# Patient Record
Sex: FEMALE | Race: WHITE | NOT HISPANIC OR LATINO | Employment: FULL TIME | URBAN - METROPOLITAN AREA
[De-identification: names, ages, dates, MRNs, and addresses within clinical notes are randomized per-mention and may not be internally consistent; named-entity substitution may affect disease eponyms.]

---

## 2018-09-28 ENCOUNTER — OFFICE VISIT (OUTPATIENT)
Dept: INTERNAL MEDICINE CLINIC | Facility: CLINIC | Age: 58
End: 2018-09-28
Payer: COMMERCIAL

## 2018-09-28 VITALS
WEIGHT: 143.2 LBS | HEIGHT: 64 IN | BODY MASS INDEX: 24.45 KG/M2 | DIASTOLIC BLOOD PRESSURE: 74 MMHG | SYSTOLIC BLOOD PRESSURE: 108 MMHG | OXYGEN SATURATION: 98 % | HEART RATE: 65 BPM | TEMPERATURE: 98.2 F

## 2018-09-28 DIAGNOSIS — Z00.00 WELLNESS EXAMINATION: ICD-10-CM

## 2018-09-28 DIAGNOSIS — Z23 NEED FOR SHINGLES VACCINE: ICD-10-CM

## 2018-09-28 DIAGNOSIS — Z11.59 NEED FOR HEPATITIS C SCREENING TEST: ICD-10-CM

## 2018-09-28 DIAGNOSIS — Z12.39 SCREENING FOR BREAST CANCER: ICD-10-CM

## 2018-09-28 DIAGNOSIS — E78.2 MIXED HYPERLIPIDEMIA: Primary | ICD-10-CM

## 2018-09-28 DIAGNOSIS — Z82.49 FAMILY HISTORY OF CORONARY ARTERY DISEASE: ICD-10-CM

## 2018-09-28 PROCEDURE — 99386 PREV VISIT NEW AGE 40-64: CPT | Performed by: INTERNAL MEDICINE

## 2018-09-28 NOTE — PATIENT INSTRUCTIONS
Problem List Items Addressed This Visit     Mixed hyperlipidemia - Primary       Continue with healthy diet and exercise, will check labs         Relevant Orders    CBC and differential    Comprehensive metabolic panel    Lipid Panel with Direct LDL reflex    TSH, 3rd generation with Free T4 reflex    Wellness examination       Discussed preventative health, cancer screening, immunizations, and safety issues  Patient's  brought medical records with her and will enter in the documentation for her last mammogram, her last colonoscopy, any immunizations, and most recent labs             Other Visit Diagnoses     Family history of coronary artery disease        Relevant Orders    Ambulatory referral to Cardiology    Need for shingles vaccine        Relevant Medications    Zoster Vac Recomb Adjuvanted ([address]) 50 MCG SUSR    Need for hepatitis C screening test        Relevant Orders    Hepatitis C antibody    Screening for breast cancer        Relevant Orders    Mammo screening bilateral w cad

## 2018-09-28 NOTE — PROGRESS NOTES
Assessment/Plan:    Mixed hyperlipidemia    Continue with healthy diet and exercise, will check labs    Wellness examination    Discussed preventative health, cancer screening, immunizations, and safety issues  Patient's  brought medical records with her and will enter in the documentation for her last mammogram, her last colonoscopy, any immunizations, and most recent labs  Diagnoses and all orders for this visit:    Mixed hyperlipidemia  -     CBC and differential; Future  -     Comprehensive metabolic panel; Future  -     Lipid Panel with Direct LDL reflex; Future  -     TSH, 3rd generation with Free T4 reflex; Future    Family history of coronary artery disease  -     Ambulatory referral to Cardiology; Future    Need for shingles vaccine  -     Zoster Vac Recomb Adjuvanted ([address]) 50 MCG SUSR; Inject 50 mcg into a muscle once for 1 dose Repeat one dose in 2 to 6 months    Need for hepatitis C screening test  -     Hepatitis C antibody; Future    Wellness examination    Screening for breast cancer  -     Mammo screening bilateral w cad; Future    Other orders  -     Cancel: influenza vaccine, [id number], quadrivalent, recombinant, PF, 0 5 mL, for patients 18 yr+ (FLUBLOK)          Subjective:      Patient ID: Aurora Estevez is a 61 y o  female  Patient here to establish care  Patient is active, eats healthy diet, sees the dentist regularly, no smoking cigarettes  Patient was in [place]  And was hospitalized for severe bout of gastroenteritis in July 2018      hyperlipidemia: This has been borderline, patient has been watching diet for this  The following portions of the patient's history were reviewed and updated as appropriate: allergies, current medications, past family history, past medical history, past social history, past surgical history and problem list     Review of Systems   Constitutional: Negative for chills, fatigue and fever     HENT: Negative for congestion, nosebleeds, postnasal drip, sore throat and trouble swallowing  Eyes: Negative for pain  Respiratory: Negative for cough, chest tightness, shortness of breath and wheezing  Cardiovascular: Negative for chest pain, palpitations and leg swelling  Gastrointestinal: Negative for abdominal pain, constipation, diarrhea, nausea and vomiting  Endocrine: Negative for polydipsia and polyuria  Genitourinary: Negative for dysuria, flank pain and hematuria  Musculoskeletal: Negative for arthralgias  Skin: Negative for rash  Neurological: Negative for dizziness, tremors and headaches  Hematological: Does not bruise/bleed easily  Psychiatric/Behavioral: Negative for confusion and dysphoric mood  The patient is not nervous/anxious  Objective:      /74   Pulse 65   Temp 98 2 °F (36 8 °C)   Ht 5' 4 3" (1 633 m)   Wt 65 kg (143 lb 3 2 oz)   SpO2 98%   BMI 24 35 kg/m²          Physical Exam   Constitutional: She is oriented to person, place, and time  She appears well-developed and well-nourished  No distress  HENT:   Head: Normocephalic and atraumatic  Right Ear: External ear normal    Left Ear: External ear normal    Eyes: Conjunctivae are normal  No scleral icterus  Neck: Normal range of motion  Neck supple  No tracheal deviation present  No thyromegaly present  Cardiovascular: Normal rate, regular rhythm and normal heart sounds  No murmur heard  Pulmonary/Chest: Effort normal and breath sounds normal  No respiratory distress  She has no wheezes  She has no rales  Abdominal: Soft  Bowel sounds are normal  There is no tenderness  There is no rebound and no guarding  Musculoskeletal: She exhibits no edema  Lymphadenopathy:     She has no cervical adenopathy  Neurological: She is alert and oriented to person, place, and time  Psychiatric: She has a normal mood and affect  Her behavior is normal  Judgment and thought content normal    Vitals reviewed

## 2018-09-28 NOTE — ASSESSMENT & PLAN NOTE
Discussed preventative health, cancer screening, immunizations, and safety issues  Patient's  brought medical records with her and will enter in the documentation for her last mammogram, her last colonoscopy, any immunizations, and most recent labs

## 2018-10-10 LAB
ALBUMIN SERPL-MCNC: 4.4 G/DL (ref 3.6–5.1)
ALBUMIN/GLOB SERPL: 1.6 (CALC) (ref 1–2.5)
ALP SERPL-CCNC: 94 U/L (ref 33–130)
ALT SERPL-CCNC: 14 U/L (ref 6–29)
AST SERPL-CCNC: 19 U/L (ref 10–35)
BASOPHILS # BLD AUTO: 68 CELLS/UL (ref 0–200)
BASOPHILS NFR BLD AUTO: 1.2 %
BILIRUB SERPL-MCNC: 0.5 MG/DL (ref 0.2–1.2)
BUN SERPL-MCNC: 11 MG/DL (ref 7–25)
BUN/CREAT SERPL: NORMAL (CALC) (ref 6–22)
CALCIUM SERPL-MCNC: 9.8 MG/DL (ref 8.6–10.4)
CHLORIDE SERPL-SCNC: 104 MMOL/L (ref 98–110)
CHOLEST SERPL-MCNC: 190 MG/DL
CHOLEST/HDLC SERPL: 3.4 (CALC)
CO2 SERPL-SCNC: 29 MMOL/L (ref 20–32)
CREAT SERPL-MCNC: 0.87 MG/DL (ref 0.5–1.05)
EOSINOPHIL # BLD AUTO: 108 CELLS/UL (ref 15–500)
EOSINOPHIL NFR BLD AUTO: 1.9 %
ERYTHROCYTE [DISTWIDTH] IN BLOOD BY AUTOMATED COUNT: 13.2 % (ref 11–15)
GLOBULIN SER CALC-MCNC: 2.7 G/DL (CALC) (ref 1.9–3.7)
GLUCOSE SERPL-MCNC: 85 MG/DL (ref 65–99)
HCT VFR BLD AUTO: 37 % (ref 35–45)
HCV AB S/CO SERPL IA: 0.01
HCV AB SERPL QL IA: NORMAL
HDLC SERPL-MCNC: 56 MG/DL
HGB BLD-MCNC: 12.2 G/DL (ref 11.7–15.5)
LDLC SERPL CALC-MCNC: 109 MG/DL (CALC)
LYMPHOCYTES # BLD AUTO: 2314 CELLS/UL (ref 850–3900)
LYMPHOCYTES NFR BLD AUTO: 40.6 %
MCH RBC QN AUTO: 30.8 PG (ref 27–33)
MCHC RBC AUTO-ENTMCNC: 33 G/DL (ref 32–36)
MCV RBC AUTO: 93.4 FL (ref 80–100)
MONOCYTES # BLD AUTO: 359 CELLS/UL (ref 200–950)
MONOCYTES NFR BLD AUTO: 6.3 %
NEUTROPHILS # BLD AUTO: 2850 CELLS/UL (ref 1500–7800)
NEUTROPHILS NFR BLD AUTO: 50 %
NONHDLC SERPL-MCNC: 134 MG/DL (CALC)
PLATELET # BLD AUTO: 307 THOUSAND/UL (ref 140–400)
PMV BLD REES-ECKER: 11.2 FL (ref 7.5–12.5)
POTASSIUM SERPL-SCNC: 4.5 MMOL/L (ref 3.5–5.3)
PROT SERPL-MCNC: 7.1 G/DL (ref 6.1–8.1)
RBC # BLD AUTO: 3.96 MILLION/UL (ref 3.8–5.1)
SL AMB EGFR AFRICAN AMERICAN: 85 ML/MIN/1.73M2
SL AMB EGFR NON AFRICAN AMERICAN: 73 ML/MIN/1.73M2
SODIUM SERPL-SCNC: 139 MMOL/L (ref 135–146)
TRIGL SERPL-MCNC: 135 MG/DL
TSH SERPL-ACNC: 2.27 MIU/L (ref 0.4–4.5)
WBC # BLD AUTO: 5.7 THOUSAND/UL (ref 3.8–10.8)

## 2018-11-01 ENCOUNTER — HOSPITAL ENCOUNTER (OUTPATIENT)
Dept: RADIOLOGY | Age: 58
Discharge: HOME | End: 2018-11-01
Payer: COMMERCIAL

## 2018-11-01 DIAGNOSIS — Z12.39 SCREENING FOR BREAST CANCER: ICD-10-CM

## 2018-11-01 PROCEDURE — 77067 SCR MAMMO BI INCL CAD: CPT

## 2018-11-08 ENCOUNTER — OFFICE VISIT (OUTPATIENT)
Dept: CARDIOLOGY CLINIC | Facility: CLINIC | Age: 58
End: 2018-11-08
Payer: COMMERCIAL

## 2018-11-08 VITALS
BODY MASS INDEX: 24.92 KG/M2 | DIASTOLIC BLOOD PRESSURE: 70 MMHG | SYSTOLIC BLOOD PRESSURE: 110 MMHG | HEIGHT: 64 IN | WEIGHT: 146 LBS | HEART RATE: 55 BPM

## 2018-11-08 DIAGNOSIS — Z82.49 FAMILY HISTORY OF CORONARY ARTERY DISEASE: ICD-10-CM

## 2018-11-08 DIAGNOSIS — Z00.00 WELLNESS EXAMINATION: Primary | ICD-10-CM

## 2018-11-08 PROCEDURE — 99203 OFFICE O/P NEW LOW 30 MIN: CPT | Performed by: INTERNAL MEDICINE

## 2018-11-08 PROCEDURE — 93000 ELECTROCARDIOGRAM COMPLETE: CPT | Performed by: INTERNAL MEDICINE

## 2018-11-08 NOTE — PROGRESS NOTES
Cardiology Consultation      Chuy Baker  1960  [id number]  [address] [facility] Renata Resendiz [facility] 141  [phone]    1  Wellness examination  POCT ECG   2  Family history of coronary artery disease  Ambulatory referral to Cardiology    POCT ECG     Reason: Family hx cad  Requesting: FABBY Gavin MD       History:     Patient presents for cardiovascular evaluation and sent for consultation regarding family history of coronary artery disease  Patient states that her parents were in her 62s with the developed coronary artery disease  Grandfather may have been in his late 45s but unclear details  Several maternal aunts have lived to their 80s    Nonetheless, as result she was referred for evaluation  Cholesterol profile as below, 10 year risk well below 7 5%  She recently moved back to [facility] where she grew up from [place]  They have lived in the [place] [address] [employer] work  She is completely asymptomatic  She is able to walk at least 5 miles without difficulty including hills  She denies any lightheadedness dizziness palpitations  A few of her sisters have had atrial fibrillation but no coronary artery disease  Patient Active Problem List   Diagnosis    Mixed hyperlipidemia    Wellness examination    Family history of coronary artery disease     Past Medical History:   Diagnosis Date    Dengue fever 2000    Malaria [address]    Miscarriage      Social History     Social History    Marital status: /Civil Union     Spouse name: N/A    Number of children: N/A    Years of education: N/A     Occupational History    Not on file       Social History Main Topics    Smoking status: Never Smoker    Smokeless tobacco: Never Used    Alcohol use Yes      Comment: rare    Drug use: Unknown    Sexual activity: Yes     Partners: Male     Other Topics Concern    Not on file Social History Narrative     no children    Works as [employer] , travels [address] History   Problem Relation Age of Onset    Coronary artery disease Mother     Coronary artery disease Father     Coronary artery disease Maternal Grandfather     Coronary artery disease Paternal Grandfather      Past Surgical History:   Procedure Laterality Date    NO PAST SURGERIES       No current outpatient prescriptions on file  Allergies   Allergen Reactions    Contrast Dye [Iodinated Diagnostic Agents] Anaphylaxis    Typhoid Vaccines Dermatitis    Airborne [B-Plex Plus]     Sulfa Antibiotics Vomiting, Fever and Wheezing       Social, Family and medication history as listed, reviewed and updated as necessary    Labs:   Lab Results   Component Value Date    K 4 5 10/09/2018     10/09/2018    CO2 29 10/09/2018    BUN 11 10/09/2018    CALCIUM 9 8 10/09/2018       Lab Results   Component Value Date    WBC 5 7 10/09/2018    HGB 12 2 10/09/2018    HCT 37 0 10/09/2018     10/09/2018       No results found for: CHOL  Lab Results   Component Value Date    HDL 56 10/09/2018     No results found for: [facility]  Lab Results   Component Value Date    TRIG 135 10/09/2018       No results found for: ALT, AST          No results found for: NTBNP    No results found for: HGBA1C    Imaging: Reviewed in epic  ECG today reveals sinus bradycardia and is within normal limits    Review of Systems:  14 systems reviewed and negative with exception of the above       PHYSICAL EXAM:        Vitals:    11/08/18 1540   BP: 110/70   Pulse: 55     Body mass index is 24 83 kg/m²    Weight (last 2 days)     Date/Time   Weight    11/08/18 1540  66 2 (146)              Gen: No acute distress  HEENT: anicteric, mucous membranes moist  Neck: supple, no jugular venous distention, or carotid bruit  Heart: regular, normal s1 and s2, no murmur/rub or gallop  Lungs :clear to auscultation bilaterally, no rales/rhonchi or wheeze  Abdomen: soft nontender, normoactive bowel sounds, no organomegaly  Ext: warm and perfused, normal femoral pulses, no edema, or clubbing  Skin: warm, no rashes  Neuro: AAO x 3, no focal findings  Psychiatric: normal affect  Musculoskeletal: no obvious joint deformities  Discussion/Summary:    1  Family history of coronary artery disease, none of which appears to be premature, additionally, cholesterol profile without familial pattern and 10 year cardiovascular risk is well below 7 5% (1 9%)    Plan:  Discussed continued daily activity/exercise and sensible/Mediterranean type diet  Cholesterol profile with 10 year risk well below 7 5% 1 9%  Certainly, no familial pattern with this profile  Although has family history of coronary artery disease most of this was with relatives in their 62s and not premature     Maternal aunts have lived to their 80s  She has a normal ECG, completely asymptomatic  Discussed alternative risk assessment with coronary calcium scoring  Discussed out-of-pocket expense with this  Discussed if score was abnormal this would likely suggest primary prevention statin    She is not interested in this which I support based on her 10 year risk, asymptomatic state  We left follow-up open-ended

## 2019-02-06 ENCOUNTER — TELEPHONE (OUTPATIENT)
Dept: INTERNAL MEDICINE CLINIC | Facility: CLINIC | Age: 59
End: 2019-02-06

## 2019-02-06 DIAGNOSIS — Z71.84 TRAVEL ADVICE ENCOUNTER: Primary | ICD-10-CM

## 2019-02-06 RX ORDER — DOXYCYCLINE 100 MG/1
100 CAPSULE ORAL 2 TIMES DAILY
Qty: 14 CAPSULE | Refills: 0 | Status: SHIPPED | OUTPATIENT
Start: 2019-02-06 | End: 2019-02-13

## 2019-03-07 DIAGNOSIS — J06.9 UPPER RESPIRATORY TRACT INFECTION, UNSPECIFIED TYPE: Primary | ICD-10-CM

## 2019-03-07 RX ORDER — DOXYCYCLINE 100 MG/1
CAPSULE ORAL
Qty: 14 CAPSULE | Refills: 0 | Status: SHIPPED | OUTPATIENT
Start: 2019-03-07 | End: 2019-03-21

## 2019-08-19 ENCOUNTER — TELEPHONE (OUTPATIENT)
Dept: INTERNAL MEDICINE CLINIC | Facility: CLINIC | Age: 59
End: 2019-08-19

## 2019-08-19 DIAGNOSIS — Z13.220 SCREENING FOR HYPERCHOLESTEROLEMIA: ICD-10-CM

## 2019-08-19 DIAGNOSIS — Z13.1 SCREENING FOR DIABETES MELLITUS: ICD-10-CM

## 2019-08-19 DIAGNOSIS — Z13.0 ENCOUNTER FOR SCREENING FOR DISEASES OF THE BLOOD AND BLOOD-FORMING ORGANS AND CERTAIN DISORDERS INVOLVING THE IMMUNE MECHANISM: Primary | ICD-10-CM

## 2019-08-19 DIAGNOSIS — Z13.29 SCREENING FOR THYROID DISORDER: ICD-10-CM

## 2019-08-19 NOTE — TELEPHONE ENCOUNTER
Patient is scheduled 9/30 for a wellness  Patient is asking if any blood work will need to be done prior      Please advise

## 2019-09-30 ENCOUNTER — OFFICE VISIT (OUTPATIENT)
Dept: INTERNAL MEDICINE CLINIC | Facility: CLINIC | Age: 59
End: 2019-09-30
Payer: COMMERCIAL

## 2019-09-30 VITALS
TEMPERATURE: 98.5 F | OXYGEN SATURATION: 99 % | HEIGHT: 64 IN | DIASTOLIC BLOOD PRESSURE: 68 MMHG | HEART RATE: 63 BPM | SYSTOLIC BLOOD PRESSURE: 106 MMHG | BODY MASS INDEX: 24.65 KG/M2 | WEIGHT: 144.4 LBS

## 2019-09-30 DIAGNOSIS — G89.29 CHRONIC RIGHT-SIDED LOW BACK PAIN WITHOUT SCIATICA: ICD-10-CM

## 2019-09-30 DIAGNOSIS — H53.9 VISUAL DISTURBANCE: Primary | ICD-10-CM

## 2019-09-30 DIAGNOSIS — Z23 NEED FOR DIPHTHERIA-TETANUS-PERTUSSIS (TDAP) VACCINE: ICD-10-CM

## 2019-09-30 DIAGNOSIS — Z00.00 WELLNESS EXAMINATION: ICD-10-CM

## 2019-09-30 DIAGNOSIS — M54.50 CHRONIC RIGHT-SIDED LOW BACK PAIN WITHOUT SCIATICA: ICD-10-CM

## 2019-09-30 DIAGNOSIS — K63.5 POLYP OF COLON, UNSPECIFIED PART OF COLON, UNSPECIFIED TYPE: ICD-10-CM

## 2019-09-30 DIAGNOSIS — E78.2 MIXED HYPERLIPIDEMIA: ICD-10-CM

## 2019-09-30 DIAGNOSIS — Z12.39 SCREENING FOR BREAST CANCER: ICD-10-CM

## 2019-09-30 PROCEDURE — 90471 IMMUNIZATION ADMIN: CPT

## 2019-09-30 PROCEDURE — 90715 TDAP VACCINE 7 YRS/> IM: CPT

## 2019-09-30 PROCEDURE — 99396 PREV VISIT EST AGE 40-64: CPT | Performed by: INTERNAL MEDICINE

## 2019-09-30 NOTE — PATIENT INSTRUCTIONS
Problem List Items Addressed This Visit        Other    Mixed hyperlipidemia     Continue with healthy diet and exercise         Wellness examination     Discussed preventative health, cancer screening, immunizations, and safety issues  Patient is due for colonoscopy, last 1 was Jory 10, 2016 with a polyp found with recommendations to follow up in 3 years  I recommend Tdap vaccination today  The patient had the flu shot last week  I recommend getting the Shingrix shot to help prevent Shingles  You can get it a pharmacy, and they can administer it there  It is a two shot series with the second shot needed between 2-6 months after the first shot  I would not recommend getting the shot before an important or fun event in case you were to have a reaction to the shot like a sore arm or flu-like symptoms  I make the same recommendation about any shot, as people can have a reaction to any shot  Visual disturbance - Primary     Symptoms do not sound like classic ocular migraines, follow up eye doctor  Chronic right-sided low back pain without sciatica     This improved with exercises             Other Visit Diagnoses     Screening for breast cancer        Relevant Orders    Mammo screening bilateral w cad    Need for diphtheria-tetanus-pertussis (Tdap) vaccine        Relevant Orders    TDAP VACCINE GREATER THAN OR EQUAL TO 8YO IM

## 2019-09-30 NOTE — ASSESSMENT & PLAN NOTE
Discussed preventative health, cancer screening, immunizations, and safety issues  Patient is due for colonoscopy, last 1 was Jory 10, 2016 with a polyp found with recommendations to follow up in 3 years  I recommend Tdap vaccination today  The patient had the flu shot last week  I recommend getting the Shingrix shot to help prevent Shingles  You can get it a pharmacy, and they can administer it there  It is a two shot series with the second shot needed between 2-6 months after the first shot  I would not recommend getting the shot before an important or fun event in case you were to have a reaction to the shot like a sore arm or flu-like symptoms  I make the same recommendation about any shot, as people can have a reaction to any shot

## 2019-09-30 NOTE — PROGRESS NOTES
Assessment/Plan:    Visual disturbance  Symptoms do not sound like classic ocular migraines, follow up eye doctor  Chronic right-sided low back pain without sciatica  This improved with exercises  Wellness examination  Discussed preventative health, cancer screening, immunizations, and safety issues  Patient is due for colonoscopy, last 1 was Jory 10, 2016 with a polyp found with recommendations to follow up in 3 years  I recommend Tdap vaccination today  The patient had the flu shot last week  I recommend getting the Shingrix shot to help prevent Shingles  You can get it a pharmacy, and they can administer it there  It is a two shot series with the second shot needed between 2-6 months after the first shot  I would not recommend getting the shot before an important or fun event in case you were to have a reaction to the shot like a sore arm or flu-like symptoms  I make the same recommendation about any shot, as people can have a reaction to any shot  Mixed hyperlipidemia  Continue with healthy diet and exercise       Diagnoses and all orders for this visit:    Visual disturbance    Chronic right-sided low back pain without sciatica    Screening for breast cancer  -     Mammo screening bilateral w cad; Future    Need for diphtheria-tetanus-pertussis (Tdap) vaccine  -     TDAP VACCINE GREATER THAN OR EQUAL TO 6YO IM    Wellness examination    Mixed hyperlipidemia    Other orders  -     Cancel: Ambulatory referral to Colorectal Surgery; Future  -     Cancel: influenza vaccine, [id number], quadrivalent, recombinant, PF, 0 5 mL, for patients 18 yr+ (FLUBLOK)          Subjective:      Patient ID: Barbara Moreira is a 60 y o  female  Patient has some flashing sensation in in the right lateral field of view of her eye about a week ago, she did see the eye doctor and was checked for retinal tear, patient having frequent symptoms for couple of days, and then the symptoms have continued occasionally  No headaches  Wellness:  No smoking cigarettes, she sees a dentist regularly, eats healthy diet, exercises  The following portions of the patient's history were reviewed and updated as appropriate: allergies, current medications, past family history, past medical history, past social history, past surgical history and problem list     Review of Systems   Constitutional: Negative for chills, fatigue and fever  HENT: Negative for congestion, nosebleeds, postnasal drip, sore throat and trouble swallowing  Eyes: Negative for pain  Respiratory: Negative for cough, chest tightness, shortness of breath and wheezing  Cardiovascular: Negative for chest pain, palpitations and leg swelling  Gastrointestinal: Negative for abdominal pain, constipation, diarrhea, nausea and vomiting  Endocrine: Negative for polydipsia and polyuria  Genitourinary: Negative for dysuria, flank pain and hematuria  Musculoskeletal: Negative for arthralgias  Skin: Negative for rash  Neurological: Negative for dizziness, tremors and headaches  Hematological: Does not bruise/bleed easily  Psychiatric/Behavioral: Negative for confusion and dysphoric mood  The patient is not nervous/anxious  Objective:      /68   Pulse 63   Temp 98 5 °F (36 9 °C)   Ht 5' 4 2" (1 631 m)   Wt 65 5 kg (144 lb 6 4 oz)   SpO2 99%   BMI 24 63 kg/m²          Physical Exam   Constitutional: She is oriented to person, place, and time  She appears well-developed and well-nourished  No distress  HENT:   Head: Normocephalic and atraumatic  Right Ear: External ear normal    Left Ear: External ear normal    Eyes: Conjunctivae are normal  No scleral icterus  Neck: Normal range of motion  Neck supple  No tracheal deviation present  No thyromegaly present  Cardiovascular: Normal rate, regular rhythm and normal heart sounds  No murmur heard  Pulmonary/Chest: Effort normal and breath sounds normal  No respiratory distress   She has no wheezes  She has no rales  Abdominal: Soft  Bowel sounds are normal  There is no tenderness  There is no rebound and no guarding  Musculoskeletal: She exhibits no edema  Lymphadenopathy:     She has no cervical adenopathy  Neurological: She is alert and oriented to person, place, and time  Psychiatric: She has a normal mood and affect  Her behavior is normal  Judgment and thought content normal    Vitals reviewed

## 2019-10-03 ENCOUNTER — OFFICE VISIT (OUTPATIENT)
Dept: INTERNAL MEDICINE CLINIC | Facility: CLINIC | Age: 59
End: 2019-10-03
Payer: COMMERCIAL

## 2019-10-03 VITALS
DIASTOLIC BLOOD PRESSURE: 76 MMHG | HEART RATE: 52 BPM | WEIGHT: 143.6 LBS | BODY MASS INDEX: 24.52 KG/M2 | SYSTOLIC BLOOD PRESSURE: 120 MMHG | RESPIRATION RATE: 14 BRPM | HEIGHT: 64 IN | OXYGEN SATURATION: 98 % | TEMPERATURE: 97.5 F

## 2019-10-03 DIAGNOSIS — T50.A25A: Primary | ICD-10-CM

## 2019-10-03 PROCEDURE — 3008F BODY MASS INDEX DOCD: CPT | Performed by: NURSE PRACTITIONER

## 2019-10-03 PROCEDURE — 99213 OFFICE O/P EST LOW 20 MIN: CPT | Performed by: NURSE PRACTITIONER

## 2019-10-03 NOTE — PROGRESS NOTES
Assessment/Plan:    Adverse reaction to diphtheria and tetanus vaccine  Reviewed side effects of vaccine  Due to the itching we will document this reaction  Go to ER if worsening or having facial swelling       Diagnoses and all orders for this visit:    Adverse effect of diphtheria and tetanus vaccine, initial encounter          Subjective:      Patient ID: Mary Man is a 60 y o  female  Patient is here for localized swelling, redness, pain, itching of the injection site after getting the tdap yesterday      The following portions of the patient's history were reviewed and updated as appropriate: allergies, current medications, past family history, past medical history, past social history, past surgical history and problem list     Review of Systems   Constitutional: Negative  HENT: Negative  Eyes: Negative  Respiratory: Negative  Cardiovascular: Negative  Gastrointestinal: Negative  Musculoskeletal: Negative  Skin: Positive for rash  Neurological: Negative  Objective:      /76   Pulse (!) 52   Temp 97 5 °F (36 4 °C) (Oral)   Resp 14   Ht 5' 4 2" (1 631 m)   Wt 65 1 kg (143 lb 9 6 oz)   SpO2 98%   BMI 24 50 kg/m²          Physical Exam   Constitutional: She is oriented to person, place, and time  She appears well-developed and well-nourished  HENT:   Head: Normocephalic and atraumatic  Right Ear: External ear normal    Left Ear: External ear normal    Nose: Nose normal    Mouth/Throat: Oropharynx is clear and moist    Eyes: Pupils are equal, round, and reactive to light  Conjunctivae are normal    Neck: Normal range of motion  Neck supple  Cardiovascular: Normal rate and regular rhythm  Pulmonary/Chest: Effort normal and breath sounds normal    Abdominal: Soft  Bowel sounds are normal    Musculoskeletal: Normal range of motion  Neurological: She is alert and oriented to person, place, and time  Skin: Skin is warm and dry          Nursing note and vitals reviewed

## 2019-10-06 PROBLEM — T50.A25A: Status: ACTIVE | Noted: 2019-10-06

## 2019-10-06 NOTE — ASSESSMENT & PLAN NOTE
Reviewed side effects of vaccine  Due to the itching we will document this reaction  Go to ER if worsening or having facial swelling

## 2020-01-03 ENCOUNTER — HOSPITAL ENCOUNTER (OUTPATIENT)
Dept: RADIOLOGY | Age: 60
Discharge: HOME | End: 2020-01-03
Payer: COMMERCIAL

## 2020-01-03 VITALS — BODY MASS INDEX: 24.41 KG/M2 | WEIGHT: 143 LBS | HEIGHT: 64 IN

## 2020-01-03 DIAGNOSIS — Z12.39 SCREENING FOR BREAST CANCER: ICD-10-CM

## 2020-01-03 PROCEDURE — 77067 SCR MAMMO BI INCL CAD: CPT

## 2020-05-18 ENCOUNTER — OFFICE VISIT (OUTPATIENT)
Dept: OBGYN CLINIC | Facility: CLINIC | Age: 60
End: 2020-05-18
Payer: COMMERCIAL

## 2020-05-18 VITALS
WEIGHT: 144.4 LBS | DIASTOLIC BLOOD PRESSURE: 64 MMHG | TEMPERATURE: 97.1 F | BODY MASS INDEX: 25.59 KG/M2 | HEIGHT: 63 IN | SYSTOLIC BLOOD PRESSURE: 112 MMHG

## 2020-05-18 DIAGNOSIS — Z12.11 SCREENING FOR COLON CANCER: ICD-10-CM

## 2020-05-18 DIAGNOSIS — Z11.51 SCREENING FOR HPV (HUMAN PAPILLOMAVIRUS): ICD-10-CM

## 2020-05-18 DIAGNOSIS — Z12.31 ENCOUNTER FOR SCREENING MAMMOGRAM FOR MALIGNANT NEOPLASM OF BREAST: ICD-10-CM

## 2020-05-18 DIAGNOSIS — Z01.419 ENCOUNTER FOR GYNECOLOGICAL EXAMINATION (GENERAL) (ROUTINE) WITHOUT ABNORMAL FINDINGS: Primary | ICD-10-CM

## 2020-05-18 PROCEDURE — 3008F BODY MASS INDEX DOCD: CPT | Performed by: STUDENT IN AN ORGANIZED HEALTH CARE EDUCATION/TRAINING PROGRAM

## 2020-05-18 PROCEDURE — G0145 SCR C/V CYTO,THINLAYER,RESCR: HCPCS | Performed by: STUDENT IN AN ORGANIZED HEALTH CARE EDUCATION/TRAINING PROGRAM

## 2020-05-18 PROCEDURE — 87624 HPV HI-RISK TYP POOLED RSLT: CPT | Performed by: STUDENT IN AN ORGANIZED HEALTH CARE EDUCATION/TRAINING PROGRAM

## 2020-05-18 PROCEDURE — 99386 PREV VISIT NEW AGE 40-64: CPT | Performed by: STUDENT IN AN ORGANIZED HEALTH CARE EDUCATION/TRAINING PROGRAM

## 2020-05-18 RX ORDER — ALBUTEROL SULFATE 90 UG/1
2 AEROSOL, METERED RESPIRATORY (INHALATION) EVERY 6 HOURS PRN
COMMUNITY

## 2020-05-20 LAB
HPV HR 12 DNA CVX QL NAA+PROBE: NEGATIVE
HPV16 DNA CVX QL NAA+PROBE: NEGATIVE
HPV18 DNA CVX QL NAA+PROBE: NEGATIVE

## 2020-05-22 ENCOUNTER — TELEPHONE (OUTPATIENT)
Dept: OBGYN CLINIC | Facility: CLINIC | Age: 60
End: 2020-05-22

## 2020-05-22 LAB
LAB AP GYN PRIMARY INTERPRETATION: NORMAL
Lab: NORMAL

## 2020-10-05 ENCOUNTER — OFFICE VISIT (OUTPATIENT)
Dept: INTERNAL MEDICINE CLINIC | Facility: CLINIC | Age: 60
End: 2020-10-05
Payer: COMMERCIAL

## 2020-10-05 VITALS
SYSTOLIC BLOOD PRESSURE: 112 MMHG | WEIGHT: 144.8 LBS | HEART RATE: 80 BPM | DIASTOLIC BLOOD PRESSURE: 62 MMHG | BODY MASS INDEX: 25.45 KG/M2 | OXYGEN SATURATION: 99 % | TEMPERATURE: 98 F

## 2020-10-05 DIAGNOSIS — E78.2 MIXED HYPERLIPIDEMIA: ICD-10-CM

## 2020-10-05 DIAGNOSIS — E55.9 VITAMIN D DEFICIENCY: ICD-10-CM

## 2020-10-05 DIAGNOSIS — Z23 NEEDS FLU SHOT: ICD-10-CM

## 2020-10-05 DIAGNOSIS — Z00.00 WELLNESS EXAMINATION: Primary | ICD-10-CM

## 2020-10-05 PROCEDURE — 90471 IMMUNIZATION ADMIN: CPT

## 2020-10-05 PROCEDURE — 3725F SCREEN DEPRESSION PERFORMED: CPT | Performed by: INTERNAL MEDICINE

## 2020-10-05 PROCEDURE — 99396 PREV VISIT EST AGE 40-64: CPT | Performed by: INTERNAL MEDICINE

## 2020-10-05 PROCEDURE — 1036F TOBACCO NON-USER: CPT | Performed by: INTERNAL MEDICINE

## 2020-10-05 PROCEDURE — 90682 RIV4 VACC RECOMBINANT DNA IM: CPT

## 2021-04-13 DIAGNOSIS — Z23 ENCOUNTER FOR IMMUNIZATION: ICD-10-CM

## 2021-09-02 ENCOUNTER — HOSPITAL ENCOUNTER (OUTPATIENT)
Dept: RADIOLOGY | Age: 61
Discharge: HOME | End: 2021-09-02
Payer: COMMERCIAL

## 2021-09-02 VITALS — WEIGHT: 143 LBS | BODY MASS INDEX: 25.34 KG/M2 | HEIGHT: 63 IN

## 2021-09-02 DIAGNOSIS — Z12.31 ENCOUNTER FOR SCREENING MAMMOGRAM FOR MALIGNANT NEOPLASM OF BREAST: ICD-10-CM

## 2021-09-02 PROCEDURE — 77067 SCR MAMMO BI INCL CAD: CPT

## 2021-09-02 PROCEDURE — 77063 BREAST TOMOSYNTHESIS BI: CPT

## 2021-10-05 ENCOUNTER — RA CDI HCC (OUTPATIENT)
Dept: OTHER | Facility: HOSPITAL | Age: 61
End: 2021-10-05

## 2021-10-06 ENCOUNTER — LAB (OUTPATIENT)
Dept: LAB | Age: 61
End: 2021-10-06
Payer: COMMERCIAL

## 2021-10-06 DIAGNOSIS — E78.2 MIXED HYPERLIPIDEMIA: ICD-10-CM

## 2021-10-06 DIAGNOSIS — E55.9 VITAMIN D DEFICIENCY: ICD-10-CM

## 2021-10-06 DIAGNOSIS — E55.9 VITAMIN D DEFICIENCY: Primary | ICD-10-CM

## 2021-10-06 LAB
25(OH)D3 SERPL-MCNC: 24.3 NG/ML (ref 30–100)
ALBUMIN SERPL BCP-MCNC: 3.9 G/DL (ref 3.5–5)
ALP SERPL-CCNC: 108 U/L (ref 46–116)
ALT SERPL W P-5'-P-CCNC: 20 U/L (ref 12–78)
ANION GAP SERPL CALCULATED.3IONS-SCNC: 2 MMOL/L (ref 4–13)
AST SERPL W P-5'-P-CCNC: 22 U/L (ref 5–45)
BASOPHILS # BLD AUTO: 0.09 THOUSANDS/ΜL (ref 0–0.1)
BASOPHILS NFR BLD AUTO: 1 % (ref 0–1)
BILIRUB SERPL-MCNC: 0.52 MG/DL (ref 0.2–1)
BUN SERPL-MCNC: 9 MG/DL (ref 5–25)
CALCIUM SERPL-MCNC: 10.1 MG/DL (ref 8.3–10.1)
CHLORIDE SERPL-SCNC: 107 MMOL/L (ref 100–108)
CHOLEST SERPL-MCNC: 214 MG/DL (ref 50–200)
CO2 SERPL-SCNC: 28 MMOL/L (ref 21–32)
CREAT SERPL-MCNC: 0.9 MG/DL (ref 0.6–1.3)
EOSINOPHIL # BLD AUTO: 0.12 THOUSAND/ΜL (ref 0–0.61)
EOSINOPHIL NFR BLD AUTO: 2 % (ref 0–6)
ERYTHROCYTE [DISTWIDTH] IN BLOOD BY AUTOMATED COUNT: 13.5 % (ref 11.6–15.1)
GFR SERPL CREATININE-BSD FRML MDRD: 69 ML/MIN/1.73SQ M
GLUCOSE P FAST SERPL-MCNC: 90 MG/DL (ref 65–99)
HCT VFR BLD AUTO: 41.9 % (ref 34.8–46.1)
HDLC SERPL-MCNC: 56 MG/DL
HGB BLD-MCNC: 13.5 G/DL (ref 11.5–15.4)
IMM GRANULOCYTES # BLD AUTO: 0.02 THOUSAND/UL (ref 0–0.2)
IMM GRANULOCYTES NFR BLD AUTO: 0 % (ref 0–2)
LDLC SERPL CALC-MCNC: 127 MG/DL (ref 0–100)
LYMPHOCYTES # BLD AUTO: 2.79 THOUSANDS/ΜL (ref 0.6–4.47)
LYMPHOCYTES NFR BLD AUTO: 38 % (ref 14–44)
MCH RBC QN AUTO: 30 PG (ref 26.8–34.3)
MCHC RBC AUTO-ENTMCNC: 32.2 G/DL (ref 31.4–37.4)
MCV RBC AUTO: 93 FL (ref 82–98)
MONOCYTES # BLD AUTO: 0.51 THOUSAND/ΜL (ref 0.17–1.22)
MONOCYTES NFR BLD AUTO: 7 % (ref 4–12)
NEUTROPHILS # BLD AUTO: 3.73 THOUSANDS/ΜL (ref 1.85–7.62)
NEUTS SEG NFR BLD AUTO: 52 % (ref 43–75)
NRBC BLD AUTO-RTO: 0 /100 WBCS
PLATELET # BLD AUTO: 322 THOUSANDS/UL (ref 149–390)
PMV BLD AUTO: 10.7 FL (ref 8.9–12.7)
POTASSIUM SERPL-SCNC: 5.5 MMOL/L (ref 3.5–5.3)
PROT SERPL-MCNC: 7.7 G/DL (ref 6.4–8.2)
RBC # BLD AUTO: 4.5 MILLION/UL (ref 3.81–5.12)
SODIUM SERPL-SCNC: 137 MMOL/L (ref 136–145)
TRIGL SERPL-MCNC: 153 MG/DL
TSH SERPL DL<=0.05 MIU/L-ACNC: 2.3 UIU/ML (ref 0.36–3.74)
WBC # BLD AUTO: 7.26 THOUSAND/UL (ref 4.31–10.16)

## 2021-10-06 PROCEDURE — 85025 COMPLETE CBC W/AUTO DIFF WBC: CPT

## 2021-10-06 PROCEDURE — 80053 COMPREHEN METABOLIC PANEL: CPT

## 2021-10-06 PROCEDURE — 36415 COLL VENOUS BLD VENIPUNCTURE: CPT

## 2021-10-06 PROCEDURE — 80061 LIPID PANEL: CPT

## 2021-10-06 PROCEDURE — 82306 VITAMIN D 25 HYDROXY: CPT

## 2021-10-06 PROCEDURE — 84443 ASSAY THYROID STIM HORMONE: CPT

## 2021-10-11 ENCOUNTER — OFFICE VISIT (OUTPATIENT)
Dept: INTERNAL MEDICINE CLINIC | Facility: CLINIC | Age: 61
End: 2021-10-11
Payer: COMMERCIAL

## 2021-10-11 VITALS
HEART RATE: 62 BPM | DIASTOLIC BLOOD PRESSURE: 82 MMHG | HEIGHT: 63 IN | BODY MASS INDEX: 26.15 KG/M2 | WEIGHT: 147.6 LBS | OXYGEN SATURATION: 99 % | SYSTOLIC BLOOD PRESSURE: 128 MMHG

## 2021-10-11 DIAGNOSIS — E55.9 VITAMIN D DEFICIENCY: ICD-10-CM

## 2021-10-11 DIAGNOSIS — E87.5 HYPERKALEMIA: ICD-10-CM

## 2021-10-11 DIAGNOSIS — Z00.00 WELLNESS EXAMINATION: Primary | ICD-10-CM

## 2021-10-11 DIAGNOSIS — E78.00 HYPERCHOLESTEREMIA: ICD-10-CM

## 2021-10-11 DIAGNOSIS — Z23 NEEDS FLU SHOT: ICD-10-CM

## 2021-10-11 PROCEDURE — 3725F SCREEN DEPRESSION PERFORMED: CPT | Performed by: INTERNAL MEDICINE

## 2021-10-11 PROCEDURE — 1036F TOBACCO NON-USER: CPT | Performed by: INTERNAL MEDICINE

## 2021-10-11 PROCEDURE — 99396 PREV VISIT EST AGE 40-64: CPT | Performed by: INTERNAL MEDICINE

## 2021-10-11 PROCEDURE — 3008F BODY MASS INDEX DOCD: CPT | Performed by: INTERNAL MEDICINE

## 2021-10-14 PROCEDURE — 90471 IMMUNIZATION ADMIN: CPT

## 2021-10-14 PROCEDURE — 90682 RIV4 VACC RECOMBINANT DNA IM: CPT

## 2022-10-04 ENCOUNTER — LAB (OUTPATIENT)
Dept: LAB | Age: 62
End: 2022-10-04
Payer: COMMERCIAL

## 2022-10-04 DIAGNOSIS — E55.9 VITAMIN D DEFICIENCY: ICD-10-CM

## 2022-10-04 DIAGNOSIS — E78.00 HYPERCHOLESTEREMIA: ICD-10-CM

## 2022-10-04 LAB
25(OH)D3 SERPL-MCNC: 40.4 NG/ML (ref 30–100)
ALBUMIN SERPL BCP-MCNC: 3.6 G/DL (ref 3.5–5)
ALP SERPL-CCNC: 100 U/L (ref 46–116)
ALT SERPL W P-5'-P-CCNC: 25 U/L (ref 12–78)
ANION GAP SERPL CALCULATED.3IONS-SCNC: 3 MMOL/L (ref 4–13)
AST SERPL W P-5'-P-CCNC: 20 U/L (ref 5–45)
BASOPHILS # BLD AUTO: 0.08 THOUSANDS/ΜL (ref 0–0.1)
BASOPHILS NFR BLD AUTO: 1 % (ref 0–1)
BILIRUB SERPL-MCNC: 0.37 MG/DL (ref 0.2–1)
BUN SERPL-MCNC: 11 MG/DL (ref 5–25)
CALCIUM SERPL-MCNC: 9.5 MG/DL (ref 8.3–10.1)
CHLORIDE SERPL-SCNC: 106 MMOL/L (ref 96–108)
CHOLEST SERPL-MCNC: 190 MG/DL
CO2 SERPL-SCNC: 27 MMOL/L (ref 21–32)
CREAT SERPL-MCNC: 0.87 MG/DL (ref 0.6–1.3)
EOSINOPHIL # BLD AUTO: 0.16 THOUSAND/ΜL (ref 0–0.61)
EOSINOPHIL NFR BLD AUTO: 2 % (ref 0–6)
ERYTHROCYTE [DISTWIDTH] IN BLOOD BY AUTOMATED COUNT: 13.5 % (ref 11.6–15.1)
GFR SERPL CREATININE-BSD FRML MDRD: 71 ML/MIN/1.73SQ M
GLUCOSE P FAST SERPL-MCNC: 94 MG/DL (ref 65–99)
HCT VFR BLD AUTO: 40.6 % (ref 34.8–46.1)
HDLC SERPL-MCNC: 53 MG/DL
HGB BLD-MCNC: 12.9 G/DL (ref 11.5–15.4)
IMM GRANULOCYTES # BLD AUTO: 0.01 THOUSAND/UL (ref 0–0.2)
IMM GRANULOCYTES NFR BLD AUTO: 0 % (ref 0–2)
LDLC SERPL CALC-MCNC: 105 MG/DL (ref 0–100)
LYMPHOCYTES # BLD AUTO: 2.3 THOUSANDS/ΜL (ref 0.6–4.47)
LYMPHOCYTES NFR BLD AUTO: 30 % (ref 14–44)
MCH RBC QN AUTO: 29.7 PG (ref 26.8–34.3)
MCHC RBC AUTO-ENTMCNC: 31.8 G/DL (ref 31.4–37.4)
MCV RBC AUTO: 94 FL (ref 82–98)
MONOCYTES # BLD AUTO: 0.58 THOUSAND/ΜL (ref 0.17–1.22)
MONOCYTES NFR BLD AUTO: 8 % (ref 4–12)
NEUTROPHILS # BLD AUTO: 4.64 THOUSANDS/ΜL (ref 1.85–7.62)
NEUTS SEG NFR BLD AUTO: 59 % (ref 43–75)
NRBC BLD AUTO-RTO: 0 /100 WBCS
PLATELET # BLD AUTO: 326 THOUSANDS/UL (ref 149–390)
PMV BLD AUTO: 10.4 FL (ref 8.9–12.7)
POTASSIUM SERPL-SCNC: 5.4 MMOL/L (ref 3.5–5.3)
PROT SERPL-MCNC: 7.6 G/DL (ref 6.4–8.4)
RBC # BLD AUTO: 4.34 MILLION/UL (ref 3.81–5.12)
SODIUM SERPL-SCNC: 136 MMOL/L (ref 135–147)
TRIGL SERPL-MCNC: 161 MG/DL
TSH SERPL DL<=0.05 MIU/L-ACNC: 3.32 UIU/ML (ref 0.45–4.5)
WBC # BLD AUTO: 7.77 THOUSAND/UL (ref 4.31–10.16)

## 2022-10-04 PROCEDURE — 80053 COMPREHEN METABOLIC PANEL: CPT

## 2022-10-04 PROCEDURE — 80061 LIPID PANEL: CPT

## 2022-10-04 PROCEDURE — 82306 VITAMIN D 25 HYDROXY: CPT

## 2022-10-04 PROCEDURE — 36415 COLL VENOUS BLD VENIPUNCTURE: CPT

## 2022-10-04 PROCEDURE — 85025 COMPLETE CBC W/AUTO DIFF WBC: CPT

## 2022-10-04 PROCEDURE — 84443 ASSAY THYROID STIM HORMONE: CPT

## 2022-10-10 ENCOUNTER — RA CDI HCC (OUTPATIENT)
Dept: OTHER | Facility: HOSPITAL | Age: 62
End: 2022-10-10

## 2022-10-10 NOTE — PROGRESS NOTES
[facility]  coding opportunities       Chart reviewed, no opportunity found: CHART REVIEWED, NO OPPORTUNITY FOUND        Patients Insurance        Commercial Insurance: [employer]

## 2022-10-17 ENCOUNTER — OFFICE VISIT (OUTPATIENT)
Dept: INTERNAL MEDICINE CLINIC | Facility: CLINIC | Age: 62
End: 2022-10-17
Payer: COMMERCIAL

## 2022-10-17 VITALS
HEIGHT: 64 IN | WEIGHT: 144.8 LBS | SYSTOLIC BLOOD PRESSURE: 116 MMHG | HEART RATE: 68 BPM | BODY MASS INDEX: 24.72 KG/M2 | OXYGEN SATURATION: 98 % | DIASTOLIC BLOOD PRESSURE: 82 MMHG

## 2022-10-17 DIAGNOSIS — Z12.31 SCREENING MAMMOGRAM, ENCOUNTER FOR: ICD-10-CM

## 2022-10-17 DIAGNOSIS — Z12.11 SCREENING FOR COLON CANCER: ICD-10-CM

## 2022-10-17 DIAGNOSIS — Z00.00 WELLNESS EXAMINATION: Primary | ICD-10-CM

## 2022-10-17 PROCEDURE — 99396 PREV VISIT EST AGE 40-64: CPT | Performed by: INTERNAL MEDICINE

## 2022-10-17 NOTE — PATIENT INSTRUCTIONS
Problem List Items Addressed This Visit          Other    Wellness examination      Discussed preventative health, cancer screening, immunizations, and safety issues  Patient's last colonoscopy was in 2016 with recommendations recheck again in 3 years, patient reminded about this  Patient up-to-date with Shingrix vaccines  I recommend yearly flu shot, she had this yesterday  Patient up-to-date with hepatitis C screening  I recommend screening mammogram, last one September 2021    All labs reviewed                 Other Visit Diagnoses       Screening mammogram, encounter for    -  Primary    Relevant Orders    Mammo screening bilateral w cad

## 2022-10-17 NOTE — ASSESSMENT & PLAN NOTE
Discussed preventative health, cancer screening, immunizations, and safety issues  Patient's last colonoscopy was in 2016 with recommendations recheck again in 3 years, patient reminded about this  Patient up-to-date with Shingrix vaccines  I recommend yearly flu shot, she had this yesterday  Patient up-to-date with hepatitis C screening  I recommend screening mammogram, last one September 2021    All labs reviewed

## 2022-10-17 NOTE — PROGRESS NOTES
Name: Evelia Young      : 1960      MRN: [id number]  Encounter Provider: Dillan Figueroa MD  Encounter Date: 10/17/2022   [facility] [address] [place], [place] [address]     1  Screening mammogram, encounter for  -     Mammo screening bilateral w cad; Future; Expected date: 10/17/2022    2  Wellness examination  Assessment & Plan:   Discussed preventative health, cancer screening, immunizations, and safety issues  Patient's last colonoscopy was in  with recommendations recheck again in 3 years, patient reminded about this  Patient up-to-date with Shingrix vaccines  I recommend yearly flu shot, she had this yesterday  Patient up-to-date with hepatitis C screening  I recommend screening mammogram, last one 2021  All labs reviewed             Subjective     Wellness:  Patient gets routine dental health, no smoking cigarettes, eats a healthy diet and exercises    Review of Systems   Constitutional: Negative for chills, fatigue and fever  HENT: Negative for congestion, nosebleeds, postnasal drip, sore throat and trouble swallowing  Eyes: Negative for pain  Respiratory: Negative for cough, chest tightness, shortness of breath and wheezing  Cardiovascular: Negative for chest pain, palpitations and leg swelling  Gastrointestinal: Negative for abdominal pain, constipation, diarrhea, nausea and vomiting  Endocrine: Negative for polydipsia and polyuria  Genitourinary: Negative for dysuria, flank pain and hematuria  Musculoskeletal: Negative for arthralgias  Skin: Negative for rash  Neurological: Negative for dizziness, tremors, light-headedness and headaches  Hematological: Does not bruise/bleed easily  Psychiatric/Behavioral: Negative for confusion and dysphoric mood  The patient is not nervous/anxious          Past Medical History:   Diagnosis Date   • Allergic 1984    airborne mold and sulfadrugs   • Arthritis 2015    in the hands   • Asthma 1985   • Dengue fever 2000   • HL (hearing loss) 2001    Hear my heart beat of chirping that interrupts hearing   • Malaria 1991   • Miscarriage    • Otitis media 2012    once   • Shingles 2004   • Visual impairment 2019 - flashing lights    astigmatism and short sighted since childhood, flashing ligh     Past Surgical History:   Procedure Laterality Date   • DILATION AND CURETTAGE OF UTERUS      x2   • NO PAST SURGERIES       Family History   Problem Relation Age of Onset   • Coronary artery disease Mother    • Hypertension Mother    • Heart disease Mother         by-pass surgery   • Asthma Mother    • Coronary artery disease Father    • Dementia Father    • Heart disease Father         by-pass surgery   • Hearing loss Father    • Coronary artery disease Maternal Grandfather    • Heart disease Maternal Grandfather    • Coronary artery disease Paternal Grandfather    • Heart disease Paternal Grandfather    • Thyroid cancer Sister 48   • Skin cancer Sister    • Arthritis Maternal Grandmother    • Vision loss Maternal Grandmother         macular degeneration   • No Known Problems Paternal Grandmother    • No Known Problems Sister    • No Known Problems Maternal Aunt    • No Known Problems Paternal Aunt    • Heart disease Paternal Aunt    • Thyroid disease Sister         removed for cancer   • Cancer Sister      Social History     Socioeconomic History   • Marital status: /Civil Union     Spouse name: None   • Number of children: None   • Years of education: None   • Highest education level: None   Occupational History   • None   Tobacco Use   • Smoking status: Never Smoker   • Smokeless tobacco: Never Used   Vaping Use   • Vaping Use: Never used   Substance and Sexual Activity   • Alcohol use:  Yes     Alcohol/week: 1 0 standard drink     Types: 1 Glasses of wine per week     Comment: very occassional   • Drug use: Never   • Sexual activity: Yes     Partners: Male     Birth control/protection: Post-menopausal Comment: took pills as a young woman   Other Topics Concern   • None   Social History Narrative     no children    Works as [employer] , travels [place] [address] Strain: Not on file   Food Insecurity: Not on file   Transportation Needs: Not on file   Physical Activity: Not on file   Stress: Not on file   Social Connections: Not on file   Intimate Partner Violence: Not on file   Housing Stability: Not on file     Current Outpatient Medications on File Prior to Visit   Medication Sig   • albuterol (PROVENTIL HFA,VENTOLIN HFA) 90 mcg/act inhaler Inhale 2 puffs every 6 (six) hours as needed for wheezing     Allergies   Allergen Reactions   • Contrast Dye [Iodinated Diagnostic Agents] Anaphylaxis   • Tetanus-Diphth-Acell Pertussis      Itching, redness, swelling, pain at injection site   • Typhoid Vaccines Dermatitis   • Sulfa Antibiotics Vomiting, Fever and Wheezing     Immunization History   Administered Date(s) Administered   • COVID-19 PFIZER VACCINE 0 3 ML IM 04/06/2021, 04/28/2021   • Influenza Injectable, MDCK, Preservative Free, Quadrivalent 09/26/2019   • Influenza Injectable, MDCK, Preservative Free, Quadrivalent, 0 5 mL 09/26/2019   • Influenza, recombinant, quadrivalent,injectable, preservative free 10/05/2020, 10/14/2021   • Tdap 09/30/2019   • Zoster Vaccine Recombinant 11/25/2019, 02/18/2020       Objective     /82   Pulse 68   Ht 5' 4 17" (1 63 m)   Wt 65 7 kg (144 lb 12 8 oz)   SpO2 98%   BMI 24 72 kg/m²     Physical Exam  Vitals reviewed  Constitutional:       General: She is not in acute distress  Appearance: Normal appearance  She is well-developed  HENT:      Head: Normocephalic and atraumatic  Right Ear: Tympanic membrane, ear canal and external ear normal  There is no impacted cerumen  Left Ear: Tympanic membrane, ear canal and external ear normal  There is no impacted cerumen        Nose: Nose normal    Eyes: General: No scleral icterus  Conjunctiva/sclera: Conjunctivae normal    Neck:      Thyroid: No thyromegaly  Trachea: No tracheal deviation  Cardiovascular:      Rate and Rhythm: Normal rate and regular rhythm  Heart sounds: Normal heart sounds  No murmur heard  Pulmonary:      Effort: Pulmonary effort is normal  No respiratory distress  Breath sounds: Normal breath sounds  No wheezing or rales  Abdominal:      General: Bowel sounds are normal       Palpations: Abdomen is soft  Tenderness: There is no abdominal tenderness  There is no guarding or rebound  Musculoskeletal:      Cervical back: Normal range of motion and neck supple  Right lower leg: No edema  Left lower leg: No edema  Lymphadenopathy:      Cervical: No cervical adenopathy  Skin:     Coloration: Skin is not jaundiced or pale  Neurological:      General: No focal deficit present  Mental Status: She is alert and oriented to person, place, and time  Psychiatric:         Behavior: Behavior normal          Thought Content:  Thought content normal          Judgment: Judgment normal        Cheng Brooks MD

## 2022-11-18 ENCOUNTER — HOSPITAL ENCOUNTER (OUTPATIENT)
Dept: RADIOLOGY | Age: 62
Discharge: HOME | End: 2022-11-18

## 2022-11-18 VITALS — BODY MASS INDEX: 24.59 KG/M2 | WEIGHT: 144 LBS | HEIGHT: 64 IN

## 2022-11-18 DIAGNOSIS — Z12.31 SCREENING MAMMOGRAM, ENCOUNTER FOR: ICD-10-CM

## 2023-01-20 ENCOUNTER — PREP FOR PROCEDURE (OUTPATIENT)
Dept: GASTROENTEROLOGY | Facility: CLINIC | Age: 63
End: 2023-01-20

## 2023-01-20 ENCOUNTER — TELEPHONE (OUTPATIENT)
Dept: GASTROENTEROLOGY | Facility: CLINIC | Age: 63
End: 2023-01-20

## 2023-01-20 DIAGNOSIS — Z12.11 COLON CANCER SCREENING: Primary | ICD-10-CM

## 2023-01-20 NOTE — TELEPHONE ENCOUNTER
01/20/23  Screened by: Leobardo Dexter MA    Referring Provider Dr Barrow    Pre- Screening: There is no height or weight on file to calculate BMI  Has patient been referred for a routine screening Colonoscopy? yes  Is the patient between 39-70 years old? yes      Previous Colonoscopy no   If yes:    Date: 2016    Facility: [facility]    Reason:       SCHEDULING STAFF: If the patient is between 39yrs-47yrs, please advise patient to confirm benefits/coverage with their insurance company for a routine screening colonoscopy, some insurance carriers will only cover at [facility] or older  If the patient is over 66years old, please schedule an office visit  Does the patient want to see a Gastroenterologist prior to their procedure OR are they having any GI symptoms? no  Has the patient been hospitalized or had abdominal surgery in the past 6 months? no    Does the patient use supplemental oxygen? no    Does the patient take Coumadin, Lovenox, Plavix, Elliquis, Xarelto, or other blood thinning medication? no    Has the patient had a stroke, cardiac event, or stent placed in the past year? no    SCHEDULING STAFF: If patient answers NO to above questions, then schedule procedure  If patient answers YES to above questions, then schedule office appointment  If patient is between 45yrs - 49yrs, please advise patient that we will have to confirm benefits & coverage with their insurance company for a routine screening colonoscopy        Scheduled date of colonoscopy (as of today):3/21/23  Physician performing colonoscopy: Dr Cote  [facility]  Bowel prep reviewed with patient: Golytely  Instructions reviewed with patient by: Thu/ynes  Clearances: N/A

## 2023-03-10 ENCOUNTER — ANESTHESIA (OUTPATIENT)
Dept: ANESTHESIOLOGY | Facility: HOSPITAL | Age: 63
End: 2023-03-10

## 2023-03-10 ENCOUNTER — ANESTHESIA EVENT (OUTPATIENT)
Dept: ANESTHESIOLOGY | Facility: HOSPITAL | Age: 63
End: 2023-03-10

## 2023-03-21 ENCOUNTER — ANESTHESIA EVENT (OUTPATIENT)
Dept: GASTROENTEROLOGY | Facility: AMBULARY SURGERY CENTER | Age: 63
End: 2023-03-21

## 2023-03-21 ENCOUNTER — ANESTHESIA (OUTPATIENT)
Dept: GASTROENTEROLOGY | Facility: AMBULARY SURGERY CENTER | Age: 63
End: 2023-03-21

## 2023-03-21 ENCOUNTER — HOSPITAL ENCOUNTER (OUTPATIENT)
Dept: GASTROENTEROLOGY | Facility: AMBULARY SURGERY CENTER | Age: 63
Setting detail: OUTPATIENT SURGERY
Discharge: HOME | End: 2023-03-21
Attending: INTERNAL MEDICINE

## 2023-03-21 VITALS
TEMPERATURE: 97.6 F | RESPIRATION RATE: 18 BRPM | HEART RATE: 62 BPM | DIASTOLIC BLOOD PRESSURE: 79 MMHG | SYSTOLIC BLOOD PRESSURE: 110 MMHG | HEIGHT: 63 IN | BODY MASS INDEX: 25.69 KG/M2 | WEIGHT: 145 LBS | OXYGEN SATURATION: 100 %

## 2023-03-21 DIAGNOSIS — Z12.11 COLON CANCER SCREENING: ICD-10-CM

## 2023-03-21 RX ORDER — SODIUM CHLORIDE, SODIUM LACTATE, POTASSIUM CHLORIDE, CALCIUM CHLORIDE 600; 310; 30; 20 MG/100ML; MG/100ML; MG/100ML; MG/100ML
INJECTION, SOLUTION INTRAVENOUS CONTINUOUS PRN
Status: DISCONTINUED | OUTPATIENT
Start: 2023-03-21 | End: 2023-03-21

## 2023-03-21 RX ORDER — PROPOFOL 10 MG/ML
INJECTION, EMULSION INTRAVENOUS AS NEEDED
Status: DISCONTINUED | OUTPATIENT
Start: 2023-03-21 | End: 2023-03-21

## 2023-03-21 RX ADMIN — PROPOFOL 50 MG: 10 INJECTION, EMULSION INTRAVENOUS at 08:46

## 2023-03-21 RX ADMIN — SODIUM CHLORIDE, SODIUM LACTATE, POTASSIUM CHLORIDE, AND CALCIUM CHLORIDE: .6; .31; .03; .02 INJECTION, SOLUTION INTRAVENOUS at 08:17

## 2023-03-21 RX ADMIN — PROPOFOL 50 MG: 10 INJECTION, EMULSION INTRAVENOUS at 08:40

## 2023-03-21 RX ADMIN — PROPOFOL 50 MG: 10 INJECTION, EMULSION INTRAVENOUS at 08:38

## 2023-03-21 RX ADMIN — PROPOFOL 20 MG: 10 INJECTION, EMULSION INTRAVENOUS at 08:34

## 2023-03-21 RX ADMIN — PROPOFOL 80 MG: 10 INJECTION, EMULSION INTRAVENOUS at 08:32

## 2023-03-21 RX ADMIN — PROPOFOL 50 MG: 10 INJECTION, EMULSION INTRAVENOUS at 08:36

## 2023-03-21 RX ADMIN — PROPOFOL 50 MG: 10 INJECTION, EMULSION INTRAVENOUS at 08:42

## 2023-03-21 NOTE — ANESTHESIA PREPROCEDURE EVALUATION
Procedure:  COLONOSCOPY    Relevant Problems   CARDIO   (+) Mixed hyperlipidemia      MUSCULOSKELETAL   (+) Chronic right-sided low back pain without sciatica      NEURO/PSYCH   (+) Chronic right-sided low back pain without sciatica   (+) Visual disturbance        Physical Exam    Airway    Mallampati score: I  TM Distance: >3 FB  Neck ROM: full     Dental   No notable dental hx     Cardiovascular  Cardiovascular exam normal    Pulmonary  Pulmonary exam normal     Other Findings        Anesthesia Plan  ASA Score- 2     Anesthesia Type- IV sedation with anesthesia with ASA Monitors  Additional Monitors:   Airway Plan:           Plan Factors-Exercise tolerance (METS): >4 METS  Chart reviewed  Patient summary reviewed  Patient is not a current smoker  Induction- intravenous  Postoperative Plan-     Informed Consent- Anesthetic plan and risks discussed with patient

## 2023-03-21 NOTE — ANESTHESIA POSTPROCEDURE EVALUATION
Post-Op Assessment Note    CV Status:  Stable  Pain Score: 0    Pain management: adequate     Mental Status:  Alert and awake   Hydration Status:  Euvolemic   PONV Controlled:  Controlled   Airway Patency:  Patent      Post Op Vitals Reviewed: Yes      Staff: CRNA         No notable events documented      BP   90/52   Temp      Pulse  61   Resp   18   SpO2   98

## 2023-03-21 NOTE — H&P
History and Physical -  [facility]  Lorrie Carrasquillo 60 y o  female MRN: [id number]    HPI: Lorrie Carrasquillo is a 60y o  year old female who presents for surveillance colonoscopy    She had personal history of colon polyp in 2016      Review of Systems    Historical Information   Past Medical History:   Diagnosis Date   • Allergic 1984    airborne mold and sulfadrugs   • Arthritis 2015    in the hands   • Asthma 1985   • Dengue fever 2000   • HL (hearing loss) 2001    Hear my heart beat of chirping that interrupts hearing   • Malaria 1991   • Miscarriage    • Otitis media 2012    once   • Shingles 2004   • Visual impairment 2019 - flashing lights    astigmatism and short sighted since childhood, flashing ligh     Past Surgical History:   Procedure Laterality Date   • DILATION AND CURETTAGE OF UTERUS      x2   • NO PAST SURGERIES       Social History   Social History     Substance and Sexual Activity   Alcohol Use Yes   • Alcohol/week: 1 0 standard drink   • Types: 1 Glasses of wine per week    Comment: very occassional     Social History     Substance and Sexual Activity   Drug Use Never     Social History     Tobacco Use   Smoking Status Never   Smokeless Tobacco Never     Family History   Problem Relation Age of Onset   • Coronary artery disease Mother    • Hypertension Mother    • Heart disease Mother         by-pass surgery   • Asthma Mother    • Coronary artery disease Father    • Dementia Father    • Heart disease Father         by-pass surgery   • Hearing loss Father    • Thyroid cancer Sister 48   • Skin cancer Sister    • No Known Problems Sister    • Thyroid disease Sister         removed for cancer   • Cancer Sister    • Arthritis Maternal Grandmother    • Vision loss Maternal Grandmother         macular degeneration   • Coronary artery disease Maternal Grandfather    • Heart disease Maternal Grandfather    • No Known Problems Paternal Grandmother    • Coronary artery disease Paternal Grandfather    • Heart disease Paternal Grandfather    • No Known Problems Maternal Aunt    • No Known Problems Paternal Aunt    • Heart disease Paternal Aunt    • Breast cancer Neg Hx        Meds/Allergies     (Not in a hospital admission)      Allergies   Allergen Reactions   • Contrast Dye [Iodinated Contrast Media] Anaphylaxis   • Tetanus-Diphth-Acell Pertussis      Itching, redness, swelling, pain at injection site   • Typhoid Vaccines Dermatitis   • Sulfa Antibiotics Vomiting, Fever and Wheezing       Objective     /72   Pulse 62   Temp 97 8 °F (36 6 °C) (Oral)   Resp 18   Ht 5' 3" (1 6 m)   Wt 65 8 kg (145 lb)   SpO2 100%   BMI 25 69 kg/m²       PHYSICAL EXAM    Gen: NAD  CV: RRR  CHEST: Clear  ABD: soft, NT/ND  EXT: no edema  Neuro: AAO      ASSESSMENT/PLAN:  This is a 60y o  year old female here for surveillance colonoscopy for personal history of colon polyps    PLAN:   Procedure: Colonoscopy with biopsy and possible polypectomy

## 2023-05-17 ENCOUNTER — TELEPHONE (OUTPATIENT)
Dept: INTERNAL MEDICINE CLINIC | Facility: CLINIC | Age: 63
End: 2023-05-17

## 2023-05-17 DIAGNOSIS — Z71.84 ENCOUNTER FOR COUNSELING FOR TRAVEL: Primary | ICD-10-CM

## 2023-05-17 RX ORDER — DOXYCYCLINE HYCLATE 100 MG/1
100 CAPSULE ORAL DAILY
Qty: 20 CAPSULE | Refills: 0 | Status: SHIPPED | OUTPATIENT
Start: 2023-05-17 | End: 2023-06-06

## 2023-05-17 NOTE — TELEPHONE ENCOUNTER
The patient will be traveling to [facility] on 6/1/2023  Her  would like a script for doxycycline  Please advise  Thank you  Please send to the [facility] on [address]   Thank you

## 2023-08-09 ENCOUNTER — TELEPHONE (OUTPATIENT)
Dept: INTERNAL MEDICINE CLINIC | Facility: CLINIC | Age: 63
End: 2023-08-09

## 2023-08-09 DIAGNOSIS — Z29.8 NEED FOR MALARIA PROPHYLAXIS: Primary | ICD-10-CM

## 2023-08-09 DIAGNOSIS — Z71.84 ENCOUNTER FOR COUNSELING FOR TRAVEL: Primary | ICD-10-CM

## 2023-08-09 DIAGNOSIS — Z29.8 NEED FOR MALARIA PROPHYLAXIS: ICD-10-CM

## 2023-08-09 RX ORDER — DOXYCYCLINE HYCLATE 100 MG/1
100 CAPSULE ORAL DAILY
Qty: 30 CAPSULE | Refills: 1 | Status: SHIPPED | OUTPATIENT
Start: 2023-08-09 | End: 2023-08-09 | Stop reason: SDUPTHER

## 2023-08-09 RX ORDER — DOXYCYCLINE HYCLATE 100 MG/1
100 CAPSULE ORAL DAILY
Qty: 30 CAPSULE | Refills: 1 | Status: SHIPPED | OUTPATIENT
Start: 2023-08-09 | End: 2023-09-08

## 2023-08-09 NOTE — TELEPHONE ENCOUNTER
Pt's spouse called stating they are traveling out of the country again in September . He is asking if you would send in a prescription for  Doxycycline (30 days) to prevent malaria. Also her Hep A & B are over 8years old. Asking foes she need to have that redone? Please advise    [facility] preferred [facility] in chart.

## 2023-08-09 NOTE — TELEPHONE ENCOUNTER
I spoke with the patient and his wife and reviewed Dr. Gianluca Jesus notes. The patient and his wife will have blood test completed for Hepatitis B. Naa Starr

## 2023-08-09 NOTE — TELEPHONE ENCOUNTER
Done, let pt know. Prescription for doxycycline sent. Regarding the hepatitis A and hepatitis B vaccine. If patient had a 2 shot series of hepatitis A where the second shot is given about 6 months after the first one, then that immunization is lifelong. Regarding hepatitis B, we could check to see if they still have immunity to hepatitis B with a blood test, order entered. If they have immunity, then no need to repeat the series, if no immunity to hepatitis B, then could repeat the hepatitis B series vaccine.

## 2023-10-13 DIAGNOSIS — Z00.00 ROUTINE GENERAL MEDICAL EXAMINATION AT A HEALTH CARE FACILITY: Primary | ICD-10-CM

## 2023-10-14 ENCOUNTER — LAB (OUTPATIENT)
Dept: LAB | Age: 63
End: 2023-10-14
Payer: COMMERCIAL

## 2023-10-14 DIAGNOSIS — Z00.00 ROUTINE GENERAL MEDICAL EXAMINATION AT A HEALTH CARE FACILITY: ICD-10-CM

## 2023-10-14 DIAGNOSIS — Z71.84 ENCOUNTER FOR COUNSELING FOR TRAVEL: ICD-10-CM

## 2023-10-14 LAB
25(OH)D3 SERPL-MCNC: 34.6 NG/ML (ref 30–100)
ALBUMIN SERPL BCP-MCNC: 4.6 G/DL (ref 3.5–5)
ALP SERPL-CCNC: 91 U/L (ref 34–104)
ALT SERPL W P-5'-P-CCNC: 24 U/L (ref 7–52)
ANION GAP SERPL CALCULATED.3IONS-SCNC: 7 MMOL/L
AST SERPL W P-5'-P-CCNC: 26 U/L (ref 13–39)
BASOPHILS # BLD AUTO: 0.07 THOUSANDS/ÂΜL (ref 0–0.1)
BASOPHILS NFR BLD AUTO: 1 % (ref 0–1)
BILIRUB SERPL-MCNC: 0.39 MG/DL (ref 0.2–1)
BUN SERPL-MCNC: 14 MG/DL (ref 5–25)
CALCIUM SERPL-MCNC: 10.1 MG/DL (ref 8.4–10.2)
CHLORIDE SERPL-SCNC: 103 MMOL/L (ref 96–108)
CHOLEST SERPL-MCNC: 293 MG/DL
CO2 SERPL-SCNC: 28 MMOL/L (ref 21–32)
CREAT SERPL-MCNC: 0.8 MG/DL (ref 0.6–1.3)
EOSINOPHIL # BLD AUTO: 0.16 THOUSAND/ÂΜL (ref 0–0.61)
EOSINOPHIL NFR BLD AUTO: 2 % (ref 0–6)
ERYTHROCYTE [DISTWIDTH] IN BLOOD BY AUTOMATED COUNT: 13.8 % (ref 11.6–15.1)
GFR SERPL CREATININE-BSD FRML MDRD: 78 ML/MIN/1.73SQ M
GLUCOSE P FAST SERPL-MCNC: 87 MG/DL (ref 65–99)
HBV SURFACE AB SER-ACNC: >500 MIU/ML
HCT VFR BLD AUTO: 41.9 % (ref 34.8–46.1)
HDLC SERPL-MCNC: 59 MG/DL
HGB BLD-MCNC: 13.5 G/DL (ref 11.5–15.4)
IMM GRANULOCYTES # BLD AUTO: 0.02 THOUSAND/UL (ref 0–0.2)
IMM GRANULOCYTES NFR BLD AUTO: 0 % (ref 0–2)
LDLC SERPL CALC-MCNC: 206 MG/DL (ref 0–100)
LYMPHOCYTES # BLD AUTO: 3.67 THOUSANDS/ÂΜL (ref 0.6–4.47)
LYMPHOCYTES NFR BLD AUTO: 45 % (ref 14–44)
MCH RBC QN AUTO: 30.1 PG (ref 26.8–34.3)
MCHC RBC AUTO-ENTMCNC: 32.2 G/DL (ref 31.4–37.4)
MCV RBC AUTO: 93 FL (ref 82–98)
MONOCYTES # BLD AUTO: 0.66 THOUSAND/ÂΜL (ref 0.17–1.22)
MONOCYTES NFR BLD AUTO: 8 % (ref 4–12)
NEUTROPHILS # BLD AUTO: 3.55 THOUSANDS/ÂΜL (ref 1.85–7.62)
NEUTS SEG NFR BLD AUTO: 44 % (ref 43–75)
NRBC BLD AUTO-RTO: 0 /100 WBCS
PLATELET # BLD AUTO: 350 THOUSANDS/UL (ref 149–390)
PMV BLD AUTO: 11 FL (ref 8.9–12.7)
POTASSIUM SERPL-SCNC: 4.4 MMOL/L (ref 3.5–5.3)
PROT SERPL-MCNC: 7.6 G/DL (ref 6.4–8.4)
RBC # BLD AUTO: 4.49 MILLION/UL (ref 3.81–5.12)
SODIUM SERPL-SCNC: 138 MMOL/L (ref 135–147)
TRIGL SERPL-MCNC: 140 MG/DL
TSH SERPL DL<=0.05 MIU/L-ACNC: 3.72 UIU/ML (ref 0.45–4.5)
WBC # BLD AUTO: 8.13 THOUSAND/UL (ref 4.31–10.16)

## 2023-10-14 PROCEDURE — 36415 COLL VENOUS BLD VENIPUNCTURE: CPT

## 2023-10-14 PROCEDURE — 82306 VITAMIN D 25 HYDROXY: CPT

## 2023-10-14 PROCEDURE — 80053 COMPREHEN METABOLIC PANEL: CPT

## 2023-10-14 PROCEDURE — 85025 COMPLETE CBC W/AUTO DIFF WBC: CPT

## 2023-10-14 PROCEDURE — 80061 LIPID PANEL: CPT

## 2023-10-14 PROCEDURE — 84443 ASSAY THYROID STIM HORMONE: CPT

## 2023-10-14 PROCEDURE — 86706 HEP B SURFACE ANTIBODY: CPT

## 2023-10-16 ENCOUNTER — RA CDI HCC (OUTPATIENT)
Dept: OTHER | Facility: HOSPITAL | Age: 63
End: 2023-10-16

## 2023-10-16 NOTE — PROGRESS NOTES
[facility] coding opportunities       Chart reviewed, no opportunity found: CHART REVIEWED, NO OPPORTUNITY FOUND        Patients Insurance        Commercial Insurance: [employer] Provider at bedside     Molly Palencia RN  08/10/22 [id number]

## 2023-10-23 ENCOUNTER — OFFICE VISIT (OUTPATIENT)
Dept: INTERNAL MEDICINE CLINIC | Facility: CLINIC | Age: 63
End: 2023-10-23
Payer: COMMERCIAL

## 2023-10-23 VITALS
WEIGHT: 143.2 LBS | HEART RATE: 55 BPM | OXYGEN SATURATION: 95 % | DIASTOLIC BLOOD PRESSURE: 82 MMHG | HEIGHT: 64 IN | BODY MASS INDEX: 24.45 KG/M2 | SYSTOLIC BLOOD PRESSURE: 118 MMHG

## 2023-10-23 DIAGNOSIS — R09.89 PAIN IN TONSIL: ICD-10-CM

## 2023-10-23 DIAGNOSIS — Z12.31 SCREENING MAMMOGRAM, ENCOUNTER FOR: ICD-10-CM

## 2023-10-23 DIAGNOSIS — E78.2 MIXED HYPERLIPIDEMIA: ICD-10-CM

## 2023-10-23 DIAGNOSIS — Z00.00 WELLNESS EXAMINATION: Primary | ICD-10-CM

## 2023-10-23 PROCEDURE — 99396 PREV VISIT EST AGE 40-64: CPT | Performed by: INTERNAL MEDICINE

## 2023-10-23 NOTE — PATIENT INSTRUCTIONS
Problem List Items Addressed This Visit          Other    Mixed hyperlipidemia     Cholesterol up this time, the patient got lab work after she had just returned from [facility] where she was eating a lot of food she does not normally eat including meat and coconut dishes, etc.         Wellness examination - Primary     Discussed preventative health, cancer screening, immunizations, and safety issues. Patient had colonoscopy March 21, 2023 with recommendations to recheck again in 7 years. Last mammogram November 2022. All labs reviewed with patient. Patient had the flu shot already.           Other Visit Diagnoses       Pain in tonsil        Relevant Orders    Ambulatory Referral to Otolaryngology

## 2023-10-23 NOTE — PROGRESS NOTES
Name: Altaf Dumont      : 1960      MRN: [id number]  Encounter Provider: Catalino Novak MD  Encounter Date: 10/23/2023   [facility]: [facility]     1. Wellness examination  Assessment & Plan:  Discussed preventative health, cancer screening, immunizations, and safety issues. Patient had colonoscopy 2023 with recommendations to recheck again in 7 years. Last mammogram 2022. All labs reviewed with patient. Patient had the flu shot already. 2. Mixed hyperlipidemia  Assessment & Plan:  Cholesterol up this time, the patient got lab work after she had just returned from [facility] where she was eating a lot of food she does not normally eat including meat and coconut dishes, etc.      3. Pain in tonsil  -     Ambulatory Referral to Otolaryngology; Future        Depression Screening and Follow-up Plan: Patient was screened for depression during today's encounter. They screened negative with a PHQ-2 score of 0. Subjective     Patient here for wellness and a concern. She had a bout of something like poison ivy or poison sumac for which she was treated with oral steroids, at that time she also developed some left tonsillar area which has persisted. Patient is non-smoker, no chewing tobacco.    Wellness: No smoking cigarettes, pt gets routine dental care      Review of Systems   Constitutional:  Negative for chills, fatigue and fever. HENT:  Negative for congestion, nosebleeds, postnasal drip, sore throat and trouble swallowing. Eyes:  Negative for pain. Respiratory:  Negative for cough, chest tightness, shortness of breath and wheezing. Cardiovascular:  Negative for chest pain, palpitations and leg swelling. Gastrointestinal:  Negative for abdominal pain, constipation, diarrhea, nausea and vomiting. Endocrine: Negative for polydipsia and polyuria. Genitourinary:  Negative for dysuria, flank pain and hematuria. Musculoskeletal:  Negative for arthralgias. Skin:  Negative for rash. Neurological:  Negative for dizziness, tremors, light-headedness and headaches. Hematological:  Does not bruise/bleed easily. Psychiatric/Behavioral:  Negative for confusion and dysphoric mood. The patient is not nervous/anxious.         Past Medical History:   Diagnosis Date   • Allergic 1984    airborne mold and sulfadrugs   • Arthritis 2015    in the hands   • Asthma 1985   • Dengue fever 2000   • HL (hearing loss) 2001    Hear my heart beat of chirping that interrupts hearing   • Malaria 1991   • Miscarriage    • Otitis media 2012    once   • Shingles 2004   • Visual impairment 2019 - flashing lights    astigmatism and short sighted since childhood, flashing ligh     Past Surgical History:   Procedure Laterality Date   • DILATION AND CURETTAGE OF UTERUS      x2   • NO PAST SURGERIES       Family History   Problem Relation Age of Onset   • Coronary artery disease Mother    • Hypertension Mother    • Heart disease Mother         by-pass surgery   • Asthma Mother    • Coronary artery disease Father    • Dementia Father    • Heart disease Father         by-pass surgery   • Hearing loss Father    • Thyroid cancer Sister 48   • Skin cancer Sister    • No Known Problems Sister    • Thyroid disease Sister         removed for cancer   • Cancer Sister    • Arthritis Maternal Grandmother    • Vision loss Maternal Grandmother         macular degeneration   • Coronary artery disease Maternal Grandfather    • Heart disease Maternal Grandfather    • No Known Problems Paternal Grandmother    • Coronary artery disease Paternal Grandfather    • Heart disease Paternal Grandfather    • No Known Problems Maternal Aunt    • No Known Problems Paternal Aunt    • Heart disease Paternal Aunt    • Breast cancer Neg Hx      Social History     Socioeconomic History   • Marital status: /Civil Union     Spouse name: None   • Number of children: None   • Years of education: None   • Highest education level: None   Occupational History   • None   Tobacco Use   • Smoking status: Never   • Smokeless tobacco: Never   Vaping Use   • Vaping Use: Never used   Substance and Sexual Activity   • Alcohol use:  Yes     Alcohol/week: 1.0 standard drink of alcohol     Types: 1 Glasses of wine per week     Comment: very occassional   • Drug use: Never   • Sexual activity: Yes     Partners: Male     Birth control/protection: Post-menopausal     Comment: took pills as a young woman   Other Topics Concern   • None   Social History Narrative     no children    Works as [employer], travels [facility] [place] Strain: Not on file   Food Insecurity: Not on file   Transportation Needs: Not on file   Physical Activity: Not on file   Stress: Not on file   Social Connections: Not on file   Intimate Partner Violence: Not on file   Housing Stability: Not on file     Current Outpatient Medications on File Prior to Visit   Medication Sig   • albuterol (PROVENTIL HFA,VENTOLIN HFA) 90 mcg/act inhaler Inhale 2 puffs every 6 (six) hours as needed for wheezing   • VITAMIN D PO Take 25 mg by mouth in the morning   • Omega-3 Fatty Acids (FISH OIL PO) Take 1 tablet by mouth in the morning     Allergies   Allergen Reactions   • Contrast Dye [Iodinated Contrast Media] Anaphylaxis   • Tetanus-Diphth-Acell Pertussis      Itching, redness, swelling, pain at injection site   • Typhoid Vaccines Dermatitis   • Sulfa Antibiotics Vomiting, Fever and Wheezing     Immunization History   Administered Date(s) Administered   • COVID-19 PFIZER VACCINE 0.3 ML IM 04/06/2021, 04/28/2021   • Influenza Injectable, MDCK, Preservative Free, Quadrivalent 09/26/2019   • Influenza Injectable, MDCK, Preservative Free, Quadrivalent, 0.5 mL 09/26/2019   • Influenza, recombinant, quadrivalent,injectable, preservative free 10/05/2020, 10/14/2021   • Tdap 09/30/2019   • Zoster Vaccine Recombinant 11/25/2019, 02/18/2020       Objective     /82   Pulse 55   Ht 5' 4" (1.626 m)   Wt 65 kg (143 lb 3.2 oz)   SpO2 95%   BMI 24.58 kg/m²     Physical Exam  Vitals reviewed. Constitutional:       General: She is not in acute distress. Appearance: Normal appearance. She is well-developed. HENT:      Head: Normocephalic and atraumatic. Right Ear: External ear normal.      Left Ear: External ear normal.      Mouth/Throat:      Comments: Tiny possible cyst left tonsillar area  Eyes:      General: No scleral icterus. Conjunctiva/sclera: Conjunctivae normal.   Neck:      Thyroid: No thyromegaly. Vascular: No carotid bruit. Trachea: No tracheal deviation. Cardiovascular:      Rate and Rhythm: Normal rate and regular rhythm. Heart sounds: Normal heart sounds. No murmur heard. Pulmonary:      Effort: Pulmonary effort is normal. No respiratory distress. Breath sounds: Normal breath sounds. No wheezing or rales. Abdominal:      General: Bowel sounds are normal.      Palpations: Abdomen is soft. Tenderness: There is no abdominal tenderness. There is no guarding or rebound. Musculoskeletal:      Cervical back: Normal range of motion and neck supple. Right lower leg: No edema. Left lower leg: No edema. Lymphadenopathy:      Cervical: No cervical adenopathy. Skin:     Coloration: Skin is not jaundiced or pale. Neurological:      General: No focal deficit present. Mental Status: She is alert and oriented to person, place, and time. Psychiatric:         Mood and Affect: Mood normal.         Behavior: Behavior normal.         Thought Content:  Thought content normal.         Judgment: Judgment normal.       Valencia Mike MD

## 2023-10-23 NOTE — ASSESSMENT & PLAN NOTE
Cholesterol up this time, the patient got lab work after she had just returned from [facility] where she was eating a lot of food she does not normally eat including meat and coconut dishes, etc.

## 2023-10-23 NOTE — ASSESSMENT & PLAN NOTE
Discussed preventative health, cancer screening, immunizations, and safety issues. Patient had colonoscopy March 21, 2023 with recommendations to recheck again in 7 years. Last mammogram November 2022. All labs reviewed with patient. Patient had the flu shot already.

## 2023-12-11 ENCOUNTER — TELEPHONE (OUTPATIENT)
Age: 63
End: 2023-12-11

## 2023-12-11 ENCOUNTER — OFFICE VISIT (OUTPATIENT)
Dept: INTERNAL MEDICINE CLINIC | Facility: CLINIC | Age: 63
End: 2023-12-11
Payer: COMMERCIAL

## 2023-12-11 VITALS — OXYGEN SATURATION: 94 % | TEMPERATURE: 97.6 F | HEART RATE: 74 BPM

## 2023-12-11 DIAGNOSIS — U07.1 COVID-19 VIRUS INFECTION: Primary | ICD-10-CM

## 2023-12-11 PROCEDURE — 99213 OFFICE O/P EST LOW 20 MIN: CPT | Performed by: INTERNAL MEDICINE

## 2023-12-11 NOTE — ASSESSMENT & PLAN NOTE
Onset of symptoms 2 days ago. Discussed option of treatment with Paxlovid. Patient is going on vacation next week. Discussed that there is a possibility of rebound symptoms a week to 10 days later with treatment of Paxlovid. Patient our discussion, and patient's low risk for complications, decision was made to just treat symptoms, and not use Paxlovid. Patient should isolate for 5 days from onset of symptoms, then wear mask when around others indoors for an additional 5 days.   Discussed there is generally a 50-50 chance household contact may get COVID

## 2023-12-11 NOTE — TELEPHONE ENCOUNTER
Same day virtual visit. Not able to make outbound calls so I am informing per message . Thank you

## 2023-12-11 NOTE — PROGRESS NOTES
Virtual Regular Visit    Verification of patient location:    Patient is located at Home in the following state in which I hold an active license [place]      Assessment/Plan:    Problem List Items Addressed This Visit        Other    COVID-19 virus infection - Primary     Onset of symptoms 2 days ago. Discussed option of treatment with Paxlovid. Patient is going on vacation next week. Discussed that there is a possibility of rebound symptoms a week to 10 days later with treatment of Paxlovid. Patient our discussion, and patient's low risk for complications, decision was made to just treat symptoms, and not use Paxlovid. Patient should isolate for 5 days from onset of symptoms, then wear mask when around others indoors for an additional 5 days. Discussed there is generally a 50-50 chance household contact may get [facility]                 Reason for visit is   Chief Complaint   Patient presents with   • Virtual Brief Visit     Covid positive symptoms fever, nasal congestion, nagging raspy cough, body aches          Encounter provider Esther Ceravntes MD    Provider located at [facility] [id number]      Recent Visits  No visits were found meeting these conditions. Showing recent visits within past 7 days and meeting all other requirements  Today's Visits  Date Type Provider Dept   12/11/23 [facility]phone [facility]   12/11/23 Office Visit Esther Cervantes MD [id number] today's visits and meeting all other requirements  Future Appointments  No visits were found meeting these conditions. Showing future appointments within next 150 days and meeting all other requirements       The patient was identified by name and date of birth. Dearl Rosy was informed that this is a telemedicine visit and that the visit is being conducted through the [employer]. She agrees to proceed. .  My office door was closed. No one else was in the room. She acknowledged consent and understanding of privacy and security of the video platform. The patient has agreed to participate and understands they can discontinue the visit at any time. Patient is aware this is a billable service. Lashay Kurtz is a 60 y.o. female  . Pt tested positive for COVID         Past Medical History:   Diagnosis Date   • Allergic 1984    airborne mold and sulfadrugs   • Arthritis 2015    in the hands   • Asthma 1985   • Dengue fever 2000   • HL (hearing loss) 2001    Hear my heart beat of chirping that interrupts hearing   • Malaria 1991   • Miscarriage    • Otitis media 2012    once   • Shingles 2004   • Tonsil stone     2023   • Visual impairment 2019 - flashing lights    astigmatism and short sighted since childhood, flashing ligh       Past Surgical History:   Procedure Laterality Date   • DILATION AND CURETTAGE OF UTERUS      x2   • NO PAST SURGERIES         Current Outpatient Medications   Medication Sig Dispense Refill   • albuterol (PROVENTIL HFA,VENTOLIN HFA) 90 mcg/act inhaler Inhale 2 puffs every 6 (six) hours as needed for wheezing     • Phenylephrine-Acetaminophen 5-325 MG TABS Take by mouth     • VITAMIN D PO Take 25 mg by mouth in the morning       No current facility-administered medications for this visit. Allergies   Allergen Reactions   • Contrast Dye [Iodinated Contrast Media] Anaphylaxis   • Tetanus-Diphth-Acell Pertussis      Itching, redness, swelling, pain at injection site   • Typhoid Vaccines Dermatitis   • Sulfa Antibiotics Vomiting, Fever and Wheezing       Review of Systems   Constitutional:  Positive for fatigue and fever. HENT:  Negative for sore throat. Respiratory:  Positive for cough. Negative for shortness of breath. Cardiovascular:  Negative for chest pain. Musculoskeletal:  Positive for arthralgias and myalgias.        Video Exam    Vitals:    12/11/23 1236   Pulse: 74   Temp: 97.6 °F (36.4 °C)   TempSrc: Tympanic   SpO2: 94%       Physical Exam  Constitutional:       General: She is not in acute distress. Pulmonary:      Effort: Pulmonary effort is normal. No respiratory distress. Neurological:      Mental Status: She is alert.           Visit Time  Total Visit Duration: 20

## 2023-12-11 NOTE — PATIENT INSTRUCTIONS
Problem List Items Addressed This Visit          Other    COVID-19 virus infection - Primary     Onset of symptoms 2 days ago. Discussed option of treatment with Paxlovid. Patient is going on vacation next week. Discussed that there is a possibility of rebound symptoms a week to 10 days later with treatment of Paxlovid. Patient our discussion, and patient's low risk for complications, decision was made to just treat symptoms, and not use Paxlovid. Patient should isolate for 5 days from onset of symptoms, then wear mask when around others indoors for an additional 5 days.   Discussed there is generally a 50-50 chance household contact may get COVID

## 2024-01-03 ENCOUNTER — TELEPHONE (OUTPATIENT)
Dept: OTHER | Facility: HOSPITAL | Age: 64
End: 2024-01-03

## 2024-01-03 DIAGNOSIS — Z29.11 NEED FOR RSV VACCINATION: Primary | ICD-10-CM

## 2024-01-03 NOTE — TELEPHONE ENCOUNTER
Patient spouse called asking if PCP could put in a referral for patient and himself to get RSV vaccine at their local pharmacy. [facility] [address].

## 2024-03-27 ENCOUNTER — HOSPITAL ENCOUNTER (OUTPATIENT)
Dept: RADIOLOGY | Age: 64
Discharge: HOME | End: 2024-03-27
Payer: COMMERCIAL

## 2024-03-27 VITALS — WEIGHT: 145 LBS | BODY MASS INDEX: 24.75 KG/M2 | HEIGHT: 64 IN

## 2024-03-27 DIAGNOSIS — Z12.31 SCREENING MAMMOGRAM, ENCOUNTER FOR: ICD-10-CM

## 2024-03-27 PROCEDURE — 77067 SCR MAMMO BI INCL CAD: CPT

## 2024-10-25 ENCOUNTER — OFFICE VISIT (OUTPATIENT)
Dept: INTERNAL MEDICINE CLINIC | Facility: CLINIC | Age: 64
End: 2024-10-25
Payer: COMMERCIAL

## 2024-10-25 VITALS
WEIGHT: 143.6 LBS | DIASTOLIC BLOOD PRESSURE: 76 MMHG | SYSTOLIC BLOOD PRESSURE: 120 MMHG | OXYGEN SATURATION: 99 % | HEART RATE: 55 BPM | HEIGHT: 64 IN | BODY MASS INDEX: 24.52 KG/M2 | TEMPERATURE: 97.3 F

## 2024-10-25 DIAGNOSIS — E78.2 MIXED HYPERLIPIDEMIA: Primary | ICD-10-CM

## 2024-10-25 DIAGNOSIS — Z00.00 HEALTHCARE MAINTENANCE: ICD-10-CM

## 2024-10-25 DIAGNOSIS — Z00.00 WELLNESS EXAMINATION: ICD-10-CM

## 2024-10-25 DIAGNOSIS — E55.9 VITAMIN D DEFICIENCY: ICD-10-CM

## 2024-10-25 DIAGNOSIS — Z82.49 FAMILY HISTORY OF EARLY CAD: ICD-10-CM

## 2024-10-25 PROCEDURE — 99214 OFFICE O/P EST MOD 30 MIN: CPT | Performed by: INTERNAL MEDICINE

## 2024-10-25 PROCEDURE — 99396 PREV VISIT EST AGE 40-64: CPT | Performed by: INTERNAL MEDICINE

## 2024-10-25 NOTE — ASSESSMENT & PLAN NOTE
Patient's sister had CABG done, and both parents had coronary artery disease    Orders:    Ambulatory Referral to Cardiology; Future    CT coronary calcium score; Future

## 2024-10-25 NOTE — ASSESSMENT & PLAN NOTE
Discussed preventative health, cancer screening, immunizations, and safety issues.  Patient had colonoscopy March 21, 2023 with recommendations recheck again in 7 years.  Last mammogram 3/27/2024.  Patient had Tdap vaccination 2019.  Patient had both Shingrix shots.  Patient had the RSV vaccine.  I recommend yearly flu shot.  I recommend Prevnar 20 at age 65.

## 2024-10-25 NOTE — PROGRESS NOTES
Ambulatory Visit  Name: Julienne Allan      : 1960      MRN: [id number]  Encounter Provider: Raf Barrow MD  Encounter Date: 10/25/2024   Encounter department: [facility]    Assessment & Plan  Mixed hyperlipidemia  Patient does have a strong family history of hypercholesterolemia, I recommend evaluation with cardiology, and to consider statin even though her 10-year estimated risk for atherosclerotic cardiovascular disease is fairly good, the fact that she has a strong family history would tip the scales towards using a statin.  Patient would also be a good candidate for coronary calcium scoring test    Orders:    CBC and differential; Future    Comprehensive metabolic panel; Future    Lipid Panel with Direct LDL reflex; Future    TSH, 3rd generation with Free T4 reflex; Future    Vitamin D deficiency    Orders:    Vitamin D 25 hydroxy; Future    Wellness examination  Discussed preventative health, cancer screening, immunizations, and safety issues.  Patient had colonoscopy 2023 with recommendations recheck again in 7 years.  Last mammogram 3/27/2024.  Patient had Tdap vaccination .  Patient had both Shingrix shots.  Patient had the RSV vaccine.  I recommend yearly flu shot.  I recommend Prevnar 20 at age 65.         Family history of early CAD  Patient's sister had CABG done, and both parents had coronary artery disease    Orders:    Ambulatory Referral to Cardiology; Future    CT coronary calcium score; Future         History of Present Illness     Patient here for wellness.  Patient eats a healthy diet, exercises, no smoking cigarettes, gets routine dental care      Review of Systems   Constitutional:  Negative for chills, fatigue and fever.   HENT:  Negative for congestion, nosebleeds, postnasal drip, sore throat and trouble swallowing.    Eyes:  Negative for pain.   Respiratory:  Negative for cough, chest tightness, shortness of breath and wheezing.     Cardiovascular:  Negative for chest pain, palpitations and leg swelling.   Gastrointestinal:  Negative for abdominal pain, constipation, diarrhea, nausea and vomiting.   Endocrine: Negative for polydipsia and polyuria.   Genitourinary:  Negative for dysuria, flank pain and hematuria.   Musculoskeletal:  Negative for arthralgias.   Skin:  Negative for rash.   Neurological:  Negative for dizziness, tremors, light-headedness and headaches.   Hematological:  Does not bruise/bleed easily.   Psychiatric/Behavioral:  Negative for confusion and dysphoric mood. The patient is not nervous/anxious.      Past Medical History:   Diagnosis Date    Allergic 1984    airborne mold and sulfadrugs    Arthritis 2015    in the hands    Asthma 1985    Dengue fever 2000    HL (hearing loss) 2001    Hear my heart beat of chirping that interrupts hearing    Malaria 1991    Miscarriage     Otitis media 2012    once    Shingles 2004    Tonsil stone     2023    Visual impairment 2019 - flashing lights    astigmatism and short sighted since childhood, flashing ligh     Past Surgical History:   Procedure Laterality Date    DILATION AND CURETTAGE OF UTERUS      x2    NO PAST SURGERIES       Family History   Problem Relation Age of Onset    Coronary artery disease Mother     Hypertension Mother     Heart disease Mother         by-pass surgery    Asthma Mother     Coronary artery disease Father     Dementia Father     Heart disease Father         by-pass surgery    Hearing loss Father     Thyroid cancer Sister 50    Skin cancer Sister     No Known Problems Sister     Thyroid disease Sister         removed for cancer    Cancer Sister     Arthritis Maternal Grandmother     Vision loss Maternal Grandmother         macular degeneration    Coronary artery disease Maternal Grandfather     Heart disease Maternal Grandfather     No Known Problems Paternal Grandmother     Coronary artery disease Paternal Grandfather     Heart disease Paternal Grandfather      No Known Problems Maternal Aunt     No Known Problems Paternal Aunt     Heart disease Paternal Aunt     Breast cancer Neg Hx      Social History     Tobacco Use    Smoking status: Never    Smokeless tobacco: Never   Vaping Use    Vaping status: Never Used   Substance and Sexual Activity    Alcohol use: Not Currently     Comment: very occassional    Drug use: Never    Sexual activity: Yes     Partners: Male     Birth control/protection: Post-menopausal     Comment: took pills as a young woman     Current Outpatient Medications on File Prior to Visit   Medication Sig    albuterol (PROVENTIL HFA,VENTOLIN HFA) 90 mcg/act inhaler Inhale 2 puffs every 6 (six) hours as needed for wheezing    VITAMIN D PO Take 25 mg by mouth in the morning    Phenylephrine-Acetaminophen 5-325 MG TABS Take by mouth     Allergies   Allergen Reactions    Contrast Dye [Iodinated Contrast Media] Anaphylaxis    Tetanus-Diphth-Acell Pertussis      Itching, redness, swelling, pain at injection site    Typhoid Vaccines Dermatitis    Sulfa Antibiotics Vomiting, Fever and Wheezing     Immunization History   Administered Date(s) Administered    COVID-19 MODERNA VACC 0.5 ML IM 05/20/2022    COVID-19 Moderna mRNA Vaccine 12 Yr+ 50 mcg/0.5 mL (Spikevax) 10/27/2023    COVID-19 PFIZER VACCINE 0.3 ML IM 04/06/2021, 04/28/2021, 11/18/2021    COVID-19 Pfizer Vac BIVALENT Javid-sucrose 12 Yr+ IM 09/27/2022    INFLUENZA 10/16/2022, 09/05/2023    Influenza Injectable, MDCK, Preservative Free, Quadrivalent 09/26/2019    Influenza Injectable, MDCK, Preservative Free, Quadrivalent, 0.5 mL 09/26/2019    Influenza, recombinant, quadrivalent,injectable, preservative free 10/05/2020, 10/14/2021    Influenza, seasonal, injectable, preservative free 09/16/2024    Respiratory Syncytial Virus Vaccine (Recombinant, Adjuvanted) 01/10/2024    Tdap 09/30/2019    Zoster Vaccine Recombinant 11/25/2019, 02/18/2020     Objective     /76 (BP Location: Left arm, Patient  "Position: Sitting, Cuff Size: Adult)   Pulse 55   Temp (!) 97.3 °F (36.3 °C) (Probe)   Ht 5' 4.17\" (1.63 m)   Wt 65.1 kg (143 lb 9.6 oz)   SpO2 99%   BMI 24.52 kg/m²     Physical Exam  Vitals reviewed.   Constitutional:       Appearance: Normal appearance. She is well-developed.   HENT:      Head: Normocephalic and atraumatic.      Right Ear: External ear normal.      Left Ear: External ear normal.      Nose: Nose normal.   Eyes:      General: No scleral icterus.     Conjunctiva/sclera: Conjunctivae normal.   Neck:      Thyroid: No thyromegaly.      Trachea: No tracheal deviation.   Cardiovascular:      Rate and Rhythm: Normal rate and regular rhythm.      Heart sounds: Normal heart sounds. No murmur heard.  Pulmonary:      Effort: No respiratory distress.      Breath sounds: Normal breath sounds. No wheezing or rales.   Abdominal:      General: Bowel sounds are normal.      Palpations: Abdomen is soft. There is no mass.      Tenderness: There is no abdominal tenderness. There is no guarding.   Musculoskeletal:      Cervical back: Normal range of motion and neck supple.      Right lower leg: No edema.      Left lower leg: No edema.   Lymphadenopathy:      Cervical: No cervical adenopathy.   Skin:     Coloration: Skin is not jaundiced or pale.   Neurological:      General: No focal deficit present.      Mental Status: She is alert and oriented to person, place, and time.   Psychiatric:         Mood and Affect: Mood normal.         Behavior: Behavior normal.         Thought Content: Thought content normal.         Judgment: Judgment normal.         "

## 2024-10-25 NOTE — PATIENT INSTRUCTIONS
Problem List Items Addressed This Visit          Other    Mixed hyperlipidemia - Primary     Patient does have a strong family history of hypercholesterolemia, I recommend evaluation with cardiology, and to consider statin even though her 10-year estimated risk for atherosclerotic cardiovascular disease is fairly good, the fact that she has a strong family history would tip the scales towards using a statin.  Patient would also be a good candidate for coronary calcium scoring test         Relevant Orders    CBC and differential    Comprehensive metabolic panel    Lipid Panel with Direct LDL reflex    TSH, 3rd generation with Free T4 reflex    Wellness examination     Discussed preventative health, cancer screening, immunizations, and safety issues.  Patient had colonoscopy March 21, 2023 with recommendations recheck again in 7 years.  Last mammogram 3/27/2024.  Patient had Tdap vaccination 2019.  Patient had both Shingrix shots.  Patient had the RSV vaccine.  I recommend yearly flu shot.  I recommend Prevnar 20 at age 65.         Family history of early CAD     Patient's sister had CABG done, and both parents had coronary artery disease         Relevant Orders    Ambulatory Referral to Cardiology    CT coronary calcium score    Vitamin D deficiency    Relevant Orders    Vitamin D 25 hydroxy

## 2024-10-25 NOTE — ASSESSMENT & PLAN NOTE
Patient does have a strong family history of hypercholesterolemia, I recommend evaluation with cardiology, and to consider statin even though her 10-year estimated risk for atherosclerotic cardiovascular disease is fairly good, the fact that she has a strong family history would tip the scales towards using a statin.  Patient would also be a good candidate for coronary calcium scoring test    Orders:    CBC and differential; Future    Comprehensive metabolic panel; Future    Lipid Panel with Direct LDL reflex; Future    TSH, 3rd generation with Free T4 reflex; Future

## 2024-11-06 ENCOUNTER — HOSPITAL ENCOUNTER (OUTPATIENT)
Dept: RADIOLOGY | Facility: HOSPITAL | Age: 64
Discharge: HOME | End: 2024-11-06
Payer: COMMERCIAL

## 2024-11-06 DIAGNOSIS — Z82.49 FAMILY HISTORY OF EARLY CAD: ICD-10-CM

## 2024-11-06 PROCEDURE — 75571 CT HRT W/O DYE W/CA TEST: CPT

## 2024-11-29 ENCOUNTER — RESULTS FOLLOW-UP (OUTPATIENT)
Dept: INTERNAL MEDICINE CLINIC | Facility: CLINIC | Age: 64
End: 2024-11-29

## 2024-11-29 DIAGNOSIS — E78.2 MIXED HYPERLIPIDEMIA: Primary | ICD-10-CM

## 2024-11-29 RX ORDER — ATORVASTATIN CALCIUM 40 MG/1
40 TABLET, FILM COATED ORAL DAILY
Qty: 100 TABLET | Refills: 3 | Status: SHIPPED | OUTPATIENT
Start: 2024-11-29

## 2024-12-26 ENCOUNTER — CONSULT (OUTPATIENT)
Dept: CARDIOLOGY CLINIC | Facility: CLINIC | Age: 64
End: 2024-12-26
Payer: COMMERCIAL

## 2024-12-26 VITALS
DIASTOLIC BLOOD PRESSURE: 60 MMHG | WEIGHT: 150.1 LBS | HEIGHT: 64 IN | HEART RATE: 60 BPM | SYSTOLIC BLOOD PRESSURE: 106 MMHG | OXYGEN SATURATION: 100 % | BODY MASS INDEX: 25.62 KG/M2

## 2024-12-26 DIAGNOSIS — E78.2 MIXED HYPERLIPIDEMIA: ICD-10-CM

## 2024-12-26 DIAGNOSIS — R93.1 AGATSTON CORONARY ARTERY CALCIUM SCORE GREATER THAN 400: Primary | ICD-10-CM

## 2024-12-26 DIAGNOSIS — Z82.49 FAMILY HISTORY OF EARLY CAD: ICD-10-CM

## 2024-12-26 PROCEDURE — 99204 OFFICE O/P NEW MOD 45 MIN: CPT | Performed by: INTERNAL MEDICINE

## 2024-12-26 NOTE — PROGRESS NOTES
Outpatient Consultation - General Cardiology   Julienne Allan 63 y.o. female   MRN: [id number]  Encounter: [id number]      PCP: Raf Barrow MD    History of Present Illness   Physician Requesting Consult: Consults   Reason for Consult / Principal Problem: High coronary calcium score     HPI: I had the pleasure to meet Julienne Allan, a 63 y.o. year old female, with history of Hyperlipidemia and family history of Asthma, Iodine contrast allergy, CAD, who was referred to [facility] outpatient Cardiology due to elevated coronary calcium score. Julienne reports that her father and sister both had open heart surgery at around the age of 60 and is considered about her risk of developing significant CAD.  She has history of hyperlipidemia but was previously not on medications.  She was recently seen by her primary care doctor and had a Noncon coronary CT that showed calcium score of 458.  She remains asymptomatic, does head exercises multiple times a week without symptoms.  She is very physically active, hikes 4-5 miles a day without symptoms.  Denied orthopnea, PND, lower extremity edema, palpitation, chest pain, dizziness.        Assessment & Plan     Plan:  1. Exercise stress NM study( notable contrast allergy in the past)   2. We discussed ways to improve her diet, agreed to see a nutritionist.   3. Lipid panel     Assessment:  Assessment & Plan  Agatston coronary artery calcium score greater than 400  - Coronary calcium score of 458  - Recently started on Atorvastatin 40mg   Family history of early CAD  - Father and sister both had CABG, at around the age of 60  Mixed hyperlipidemia  - Started on Atorvastatin   - Most recent LDL is from 10/2023, LDL of 206, Cholestrol 293, HDL 59, Triglyceride 140     Case discussed and reviewed with Dr. Parr who agrees with my assessment and plan. Thank you for involving us in the care of your patient.      Mariann Espinosa MD  Cardiology Fellow   PGY-6        Review of Systems  Review  of system was conducted and was negative except for as stated in the HPI.      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    Asthma 1985    Dengue fever 2000    HL (hearing loss) 2001    Hear my heart beat of chirping that interrupts hearing    Malaria 1991    Miscarriage     Otitis media 2012    once    Shingles 2004    Tonsil stone     2023    Visual impairment 2019 - flashing lights    astigmatism and short sighted since childhood, flashing ligh     Past Surgical History:   Procedure Laterality Date    DILATION AND CURETTAGE OF UTERUS      x2    NO PAST SURGERIES       Social History     Substance and Sexual Activity   Alcohol Use Not Currently    Comment: very occassional     Social History     Substance and Sexual Activity   Drug Use Never     Social History     Tobacco Use   Smoking Status Never   Smokeless Tobacco Never     Family History: As mentioned above     Meds/Allergies   Home Medications:   Current Outpatient Medications:     albuterol (PROVENTIL HFA,VENTOLIN HFA) 90 mcg/act inhaler, Inhale 2 puffs every 6 (six) hours as needed for wheezing, Disp: , Rfl:     atorvastatin (LIPITOR) 40 mg tablet, Take 1 tablet (40 mg total) by mouth daily, Disp: 100 tablet, Rfl: 3    Phenylephrine-Acetaminophen 5-325 MG TABS, Take by mouth, Disp: , Rfl:     VITAMIN D PO, Take 25 mg by mouth in the morning, Disp: , Rfl:     Allergies   Allergen Reactions    Contrast Dye [Iodinated Contrast Media] Anaphylaxis    Tetanus-Diphth-Acell Pertussis      Itching, redness, swelling, pain at injection site    Typhoid Vaccines Dermatitis    Sulfa Antibiotics Vomiting, Fever and Wheezing         Objective   Vitals: not currently breastfeeding.      Physical Exam    GEN: Julienne Allan appears well, alert and oriented x 3, pleasant and cooperative   HEENT:  Normocephalic, atraumatic, anicteric, moist mucous membranes  NECK: No JVD  "or carotid bruits   HEART: Normal rhythm, regular rate, normal S1 and S2, no murmurs, clicks, gallops or rubs   LUNGS: Clear to auscultation bilaterally; no wheezes, rales, or rhonchi; respiration nonlabored   ABDOMEN:  Normoactive bowel sounds, soft, no tenderness, no distention  EXTREMITIES: peripheral pulses palpable; no edema  NEURO: no gross focal findings; cranial nerves grossly intact   SKIN:  Dry, intact, warm to touch    Lab Results: I have personally reviewed pertinent lab results.    No results found for: \"HSTNI0\", \"HSTNI2\", \"HSTNI4\", \"HSTNI\"  No results found for: \"NTBNP\"  Lab Results   Component Value Date    TRIG 140 10/14/2023    HDL 59 10/14/2023    LDLCALC 206 (H) 10/14/2023     Lab Results   Component Value Date    K 4.4 10/14/2023    CO2 28 10/14/2023     10/14/2023    BUN 14 10/14/2023    CREATININE 0.80 10/14/2023    ALT 24 10/14/2023    AST 26 10/14/2023     Lab Results   Component Value Date    WBC 8.13 10/14/2023    HGB 13.5 10/14/2023    HCT 41.9 10/14/2023    MCV 93 10/14/2023     10/14/2023     No results found for: \"INR\"      Previous STRESS TEST:  No results found for this or any previous visit.     No results found for this or any previous visit.    No results found for this or any previous visit.      Previous Cath/PCI:  No results found for this or any previous visit.    No results found for this or any previous visit.    No results found for this or any previous visit.      ECHO:  No results found for this or any previous visit.    No results found for this or any previous visit.      GENOVEVA:  No results found for this or any previous visit.    No results found for this or any previous visit.      CMR:  No results found for this or any previous visit.    No results found for this or any previous visit.    No results found for this or any previous visit.      HOLTER  No results found for this or any previous visit.    No results found for this or any previous visit.      "

## 2024-12-26 NOTE — ASSESSMENT & PLAN NOTE
- Started on Atorvastatin   - Most recent LDL is from 10/2023, LDL of 206, Cholestrol 293, HDL 59, Triglyceride 140

## 2024-12-31 ENCOUNTER — HOSPITAL ENCOUNTER (OUTPATIENT)
Dept: NON INVASIVE DIAGNOSTICS | Facility: CLINIC | Age: 64
Discharge: HOME | End: 2024-12-31
Payer: COMMERCIAL

## 2024-12-31 VITALS
BODY MASS INDEX: 25.61 KG/M2 | DIASTOLIC BLOOD PRESSURE: 84 MMHG | SYSTOLIC BLOOD PRESSURE: 130 MMHG | WEIGHT: 150 LBS | HEIGHT: 64 IN

## 2024-12-31 DIAGNOSIS — Z82.49 FAMILY HISTORY OF EARLY CAD: ICD-10-CM

## 2024-12-31 DIAGNOSIS — R93.1 AGATSTON CORONARY ARTERY CALCIUM SCORE GREATER THAN 400: ICD-10-CM

## 2024-12-31 LAB
CHEST PAIN STATEMENT: NORMAL
MAX DIASTOLIC BP: 78 MMHG
MAX HR PERCENT: 107 %
MAX HR: 166 BPM
MAX PREDICTED HEART RATE: 155 BPM
NUC STRESS EJECTION FRACTION: 67 %
PROTOCOL NAME: NORMAL
RATE PRESSURE PRODUCT: NORMAL
REASON FOR TERMINATION: NORMAL
SL CV REST NUCLEAR ISOTOPE DOSE: 10.6 MCI
SL CV STRESS NUCLEAR ISOTOPE DOSE: 32.6 MCI
SL CV STRESS RECOVERY BP: NORMAL MMHG
SL CV STRESS RECOVERY HR: 90 BPM
SL CV STRESS RECOVERY O2 SAT: 98 %
SL CV STRESS STAGE REACHED: 4
STRESS ANGINA INDEX: 0
STRESS BASELINE BP: NORMAL MMHG
STRESS BASELINE HR: 67 BPM
STRESS DUKE TREADMILL SCORE: 7
STRESS O2 SAT REST: 99 %
STRESS PEAK HR: 166 BPM
STRESS POST ESTIMATED WORKLOAD: 13.4 METS
STRESS POST EXERCISE DUR MIN: 12 MIN
STRESS POST EXERCISE DUR MIN: 12 MIN
STRESS POST EXERCISE DUR SEC: 0 SEC
STRESS POST EXERCISE DUR SEC: 0 SEC
STRESS POST O2 SAT PEAK: 97 %
STRESS POST PEAK BP: 164 MMHG
STRESS POST PEAK HR: 166 BPM
STRESS POST PEAK SYSTOLIC BP: 164 MMHG
STRESS ST DEPRESSION: 1 MM
STRESS/REST PERFUSION RATIO: 1.03
TARGET HR FORMULA: NORMAL
TEST INDICATION: NORMAL

## 2024-12-31 PROCEDURE — 78452 HT MUSCLE IMAGE SPECT MULT: CPT

## 2024-12-31 PROCEDURE — 93017 CV STRESS TEST TRACING ONLY: CPT

## 2024-12-31 PROCEDURE — A9502 TC99M TETROFOSMIN: HCPCS

## 2024-12-31 PROCEDURE — 93018 CV STRESS TEST I&R ONLY: CPT | Performed by: STUDENT IN AN ORGANIZED HEALTH CARE EDUCATION/TRAINING PROGRAM

## 2024-12-31 PROCEDURE — 78452 HT MUSCLE IMAGE SPECT MULT: CPT | Performed by: STUDENT IN AN ORGANIZED HEALTH CARE EDUCATION/TRAINING PROGRAM

## 2024-12-31 PROCEDURE — 93016 CV STRESS TEST SUPVJ ONLY: CPT | Performed by: STUDENT IN AN ORGANIZED HEALTH CARE EDUCATION/TRAINING PROGRAM

## 2025-01-14 ENCOUNTER — APPOINTMENT (OUTPATIENT)
Dept: LAB | Age: 65
End: 2025-01-14
Payer: COMMERCIAL

## 2025-01-14 DIAGNOSIS — E78.2 MIXED HYPERLIPIDEMIA: ICD-10-CM

## 2025-01-14 DIAGNOSIS — R93.1 AGATSTON CORONARY ARTERY CALCIUM SCORE GREATER THAN 400: ICD-10-CM

## 2025-01-14 DIAGNOSIS — E55.9 VITAMIN D DEFICIENCY: ICD-10-CM

## 2025-01-14 DIAGNOSIS — Z82.49 FAMILY HISTORY OF EARLY CAD: ICD-10-CM

## 2025-01-14 LAB
25(OH)D3 SERPL-MCNC: 30.4 NG/ML (ref 30–100)
ALBUMIN SERPL BCG-MCNC: 4.3 G/DL (ref 3.5–5)
ALP SERPL-CCNC: 88 U/L (ref 34–104)
ALT SERPL W P-5'-P-CCNC: 24 U/L (ref 7–52)
ANION GAP SERPL CALCULATED.3IONS-SCNC: 6 MMOL/L (ref 4–13)
AST SERPL W P-5'-P-CCNC: 23 U/L (ref 13–39)
BASOPHILS # BLD AUTO: 0.08 THOUSANDS/ΜL (ref 0–0.1)
BASOPHILS NFR BLD AUTO: 1 % (ref 0–1)
BILIRUB SERPL-MCNC: 0.38 MG/DL (ref 0.2–1)
BUN SERPL-MCNC: 12 MG/DL (ref 5–25)
CALCIUM SERPL-MCNC: 9.6 MG/DL (ref 8.4–10.2)
CHLORIDE SERPL-SCNC: 104 MMOL/L (ref 96–108)
CHOLEST SERPL-MCNC: 118 MG/DL (ref ?–200)
CO2 SERPL-SCNC: 30 MMOL/L (ref 21–32)
CREAT SERPL-MCNC: 0.85 MG/DL (ref 0.6–1.3)
EOSINOPHIL # BLD AUTO: 0.18 THOUSAND/ΜL (ref 0–0.61)
EOSINOPHIL NFR BLD AUTO: 2 % (ref 0–6)
ERYTHROCYTE [DISTWIDTH] IN BLOOD BY AUTOMATED COUNT: 13.6 % (ref 11.6–15.1)
GFR SERPL CREATININE-BSD FRML MDRD: 72 ML/MIN/1.73SQ M
GLUCOSE P FAST SERPL-MCNC: 92 MG/DL (ref 65–99)
HCT VFR BLD AUTO: 41.5 % (ref 34.8–46.1)
HDLC SERPL-MCNC: 60 MG/DL
HGB BLD-MCNC: 13.4 G/DL (ref 11.5–15.4)
IMM GRANULOCYTES # BLD AUTO: 0.01 THOUSAND/UL (ref 0–0.2)
IMM GRANULOCYTES NFR BLD AUTO: 0 % (ref 0–2)
LDLC SERPL CALC-MCNC: 35 MG/DL (ref 0–100)
LDLC SERPL DIRECT ASSAY-MCNC: 29 MG/DL (ref 0–100)
LYMPHOCYTES # BLD AUTO: 3 THOUSANDS/ΜL (ref 0.6–4.47)
LYMPHOCYTES NFR BLD AUTO: 39 % (ref 14–44)
MCH RBC QN AUTO: 30.2 PG (ref 26.8–34.3)
MCHC RBC AUTO-ENTMCNC: 32.3 G/DL (ref 31.4–37.4)
MCV RBC AUTO: 94 FL (ref 82–98)
MONOCYTES # BLD AUTO: 0.58 THOUSAND/ΜL (ref 0.17–1.22)
MONOCYTES NFR BLD AUTO: 8 % (ref 4–12)
NEUTROPHILS # BLD AUTO: 3.77 THOUSANDS/ΜL (ref 1.85–7.62)
NEUTS SEG NFR BLD AUTO: 50 % (ref 43–75)
NRBC BLD AUTO-RTO: 0 /100 WBCS
PLATELET # BLD AUTO: 335 THOUSANDS/UL (ref 149–390)
PMV BLD AUTO: 10.8 FL (ref 8.9–12.7)
POTASSIUM SERPL-SCNC: 5.5 MMOL/L (ref 3.5–5.3)
PROT SERPL-MCNC: 7.2 G/DL (ref 6.4–8.4)
RBC # BLD AUTO: 4.44 MILLION/UL (ref 3.81–5.12)
SODIUM SERPL-SCNC: 140 MMOL/L (ref 135–147)
TRIGL SERPL-MCNC: 117 MG/DL (ref ?–150)
TSH SERPL DL<=0.05 MIU/L-ACNC: 3.97 UIU/ML (ref 0.45–4.5)
WBC # BLD AUTO: 7.62 THOUSAND/UL (ref 4.31–10.16)

## 2025-01-14 PROCEDURE — 80061 LIPID PANEL: CPT

## 2025-01-14 PROCEDURE — 85025 COMPLETE CBC W/AUTO DIFF WBC: CPT

## 2025-01-14 PROCEDURE — 80053 COMPREHEN METABOLIC PANEL: CPT

## 2025-01-14 PROCEDURE — 84443 ASSAY THYROID STIM HORMONE: CPT

## 2025-01-14 PROCEDURE — 82306 VITAMIN D 25 HYDROXY: CPT

## 2025-01-14 PROCEDURE — 36415 COLL VENOUS BLD VENIPUNCTURE: CPT

## 2025-01-14 PROCEDURE — 83721 ASSAY OF BLOOD LIPOPROTEIN: CPT

## 2025-01-21 ENCOUNTER — TELEPHONE (OUTPATIENT)
Dept: NUTRITION | Facility: HOSPITAL | Age: 65
End: 2025-01-21

## 2025-01-21 NOTE — TELEPHONE ENCOUNTER
Spoke with pt r/t appointment next month for medical nutrition therapy. Made her aware that medicare only covers CKD/DM dx's and without either, it would be an $80 self-pay due at time of service. Pt will keep appointment for now and will reach out to this writer or use [employer] portal if wishes to/needs to cancel.

## 2025-01-23 NOTE — PROGRESS NOTES
Spoke with Julienne this morning about the NM SPECT findings and plan moving forward.  Risk and benefits of coronary catheterization versus coronary CTA were discussed. Julienne has history of contrast allergy and understands that this increases her risk of having recurrence.  We discussed premedication as a prophylactic measure for contrast allergy.  She understands that premedication does not prevent adverse allergic reaction 100% of the time.  However, the less invasive of the 2 procedure which is coronary CTA with FFR sounded more reasonable to her.  She agreed to make an appointment and will follow up with me afterwards.    She verbalized understanding and agreement.  Orders were placed.  
Knee XR INTERPRETATION:  +non displaced avulsion fracture tibial tuberosity; + soft tissue swelling noted; normal bony alignment.

## 2025-01-24 ENCOUNTER — PATIENT OUTREACH (OUTPATIENT)
Dept: CARDIOLOGY CLINIC | Facility: CLINIC | Age: 65
End: 2025-01-24

## 2025-01-24 NOTE — PROGRESS NOTES
Julienne has severe contrast allergy and for that reason I was informed by Dr. Justin from Radiology that they will not be able to perform coronary CTA for her. I reached out to Julienne and had a thorough discussion about her options. She is agreeable to proceed with a left heart cath.     She understand that she will be taking Methylprednisolone and Benadryl to prep for the procedure. Informed interventional cardiology about her allergic reaction and need to anesthesia.

## 2025-01-27 ENCOUNTER — TELEPHONE (OUTPATIENT)
Dept: CARDIOLOGY CLINIC | Facility: CLINIC | Age: 65
End: 2025-01-27

## 2025-01-27 NOTE — TELEPHONE ENCOUNTER
Patient scheduled for LEFT Heart Cath on 2/7/25 at [facility] with Dr. LORD.      Instructions sent to patient through [employer].        Patient aware of all general instructions.    Medication holds:   NO MED HOLDS     Labs to be done on 1/14/25   CMP / CBC (fasting 8 hours)       Called in contrast allergy dye prep into [facility] on [address].

## 2025-01-28 ENCOUNTER — RA CDI HCC (OUTPATIENT)
Dept: OTHER | Facility: HOSPITAL | Age: 65
End: 2025-01-28

## 2025-02-03 ENCOUNTER — OFFICE VISIT (OUTPATIENT)
Dept: INTERNAL MEDICINE CLINIC | Facility: CLINIC | Age: 65
End: 2025-02-03
Payer: COMMERCIAL

## 2025-02-03 ENCOUNTER — HOSPITAL ENCOUNTER (OUTPATIENT)
Dept: RADIOLOGY | Facility: HOSPITAL | Age: 65
Discharge: HOME | End: 2025-02-03

## 2025-02-03 VITALS
OXYGEN SATURATION: 100 % | DIASTOLIC BLOOD PRESSURE: 70 MMHG | WEIGHT: 145.6 LBS | SYSTOLIC BLOOD PRESSURE: 128 MMHG | HEART RATE: 64 BPM | HEIGHT: 64 IN | BODY MASS INDEX: 24.86 KG/M2

## 2025-02-03 DIAGNOSIS — Z13.820 ENCOUNTER FOR SCREENING FOR OSTEOPOROSIS: ICD-10-CM

## 2025-02-03 DIAGNOSIS — N39.3 STRESS INCONTINENCE OF URINE: ICD-10-CM

## 2025-02-03 DIAGNOSIS — Z00.00 WELCOME TO MEDICARE PREVENTIVE VISIT: Primary | ICD-10-CM

## 2025-02-03 DIAGNOSIS — E78.2 MIXED HYPERLIPIDEMIA: ICD-10-CM

## 2025-02-03 DIAGNOSIS — Z78.0 ASYMPTOMATIC MENOPAUSAL STATE: ICD-10-CM

## 2025-02-03 PROCEDURE — G0402 INITIAL PREVENTIVE EXAM: HCPCS | Performed by: INTERNAL MEDICINE

## 2025-02-03 PROCEDURE — G0403 EKG FOR INITIAL PREVENT EXAM: HCPCS | Performed by: INTERNAL MEDICINE

## 2025-02-03 NOTE — PATIENT INSTRUCTIONS
Problem List Items Addressed This Visit          Urinary    Stress incontinence of urine    Can try Kegels, discussed if having worsening symptoms, can do referral to urogyn               Other    Mixed hyperlipidemia    Continue atorvastatin, especially with elevated coronary artery calcium score, patient is going to follow with cardiology for cardiac catheterization            Welcome to Medicare preventive visit - Primary    Discussed preventative health, cancer screening, immunizations, and safety issues.  Last colonoscopy March 21, 2023 with recommendations recheck again in 7 years.  Last mammogram 3/27/2024, and she is getting again in March.  Patient had Tdap vaccination 2019.  Patient had both Shingrix shots.  Patient had the RSV vaccine.  I recommend yearly flu shot, patient already had this year.  I recommend Prevnar 20, patient already had this.  I recommend DEXA scan.    Eye chart exam done today, EKG done today shows  Orders:    POCT ECG         Relevant Orders    POCT ECG (Completed)     Other Visit Diagnoses         Encounter for screening for osteoporosis        Relevant Orders    DXA bone density spine hip and pelvis      Asymptomatic menopausal state        Relevant Orders    DXA bone density spine hip and pelvis            Medicare Preventive Visit Patient Instructions  Thank you for completing your Welcome to Medicare Visit or Medicare Annual Wellness Visit today. Your next wellness visit will be due in one year (2/4/2026).  The screening/preventive services that you may require over the next 5-10 years are detailed below. Some tests may not apply to you based off risk factors and/or age. Screening tests ordered at today's visit but not completed yet may show as past due. Also, please note that scanned in results may not display below.  Preventive Screenings:  Service Recommendations Previous Testing/Comments   Colorectal Cancer Screening  * Colonoscopy    * Fecal Occult Blood Test (FOBT)/Fecal  Immunochemical Test (FIT)  * Fecal DNA/Cologuard Test  * Flexible Sigmoidoscopy Age: 45-75 years old   Colonoscopy: every 10 years (may be performed more frequently if at higher risk)  OR  FOBT/FIT: every 1 year  OR  Cologuard: every 3 years  OR  Sigmoidoscopy: every 5 years  Screening may be recommended earlier than age 45 if at higher risk for colorectal cancer. Also, an individualized decision between you and your healthcare provider will decide whether screening between the ages of 76-85 would be appropriate. Colonoscopy: 03/21/2023  FOBT/FIT: Not on file  Cologuard: Not on file  Sigmoidoscopy: Not on file          Breast Cancer Screening Age: 40+ years old  Frequency: every 1-2 years  Not required if history of left and right mastectomy Mammogram: 03/27/2024        Cervical Cancer Screening Between the ages of 21-29, pap smear recommended once every 3 years.   Between the ages of 30-65, can perform pap smear with HPV co-testing every 5 years.   Recommendations may differ for women with a history of total hysterectomy, cervical cancer, or abnormal pap smears in past. Pap Smear: 05/18/2020        Hepatitis C Screening Once for adults born between 1945 and 1965  More frequently in patients at high risk for Hepatitis C Hep C Antibody: 10/09/2018        Diabetes Screening 1-2 times per year if you're at risk for diabetes or have pre-diabetes Fasting glucose: 92 mg/dL (1/14/2025)  A1C: No results in last 5 years (No results in last 5 years)      Cholesterol Screening Once every 5 years if you don't have a lipid disorder. May order more often based on risk factors. Lipid panel: 01/14/2025          Other Preventive Screenings Covered by Medicare:  Abdominal Aortic Aneurysm (AAA) Screening: covered once if your at risk. You're considered to be at risk if you have a family history of AAA.  Lung Cancer Screening: covers low dose CT scan once per year if you meet all of the following conditions: (1) Age 55-77; (2) No signs  or symptoms of lung cancer; (3) Current smoker or have quit smoking within the last 15 years; (4) You have a tobacco smoking history of at least 20 pack years (packs per day multiplied by number of years you smoked); (5) You get a written order from a healthcare provider.  Glaucoma Screening: covered annually if you're considered high risk: (1) You have diabetes OR (2) Family history of glaucoma OR (3)  aged 50 and older OR (4)  American aged 65 and older  Osteoporosis Screening: covered every 2 years if you meet one of the following conditions: (1) You're estrogen deficient and at risk for osteoporosis based off medical history and other findings; (2) Have a vertebral abnormality; (3) On glucocorticoid therapy for more than 3 months; (4) Have primary hyperparathyroidism; (5) On osteoporosis medications and need to assess response to drug therapy.   Last bone density test (DXA Scan): Not on file.  HIV Screening: covered annually if you're between the age of 15-65. Also covered annually if you are younger than 15 and older than 65 with risk factors for HIV infection. For pregnant patients, it is covered up to 3 times per pregnancy.    Immunizations:  Immunization Recommendations   Influenza Vaccine Annual influenza vaccination during flu season is recommended for all persons aged >= 6 months who do not have contraindications   Pneumococcal Vaccine   * Pneumococcal conjugate vaccine = PCV13 (Prevnar 13), PCV15 (Vaxneuvance), PCV20 (Prevnar 20)  * Pneumococcal polysaccharide vaccine = PPSV23 (Pneumovax) Adults 19-65 yo with certain risk factors or if 65+ yo  If never received any pneumonia vaccine: recommend Prevnar 20 (PCV20)  Give PCV20 if previously received 1 dose of PCV13 or PPSV23   Hepatitis B Vaccine 3 dose series if at intermediate or high risk (ex: diabetes, end stage renal disease, liver disease)   Respiratory syncytial virus (RSV) Vaccine - COVERED BY MEDICARE PART D  * RSVPreF3  (Arexvy) CDC recommends that adults 60 years of age and older may receive a single dose of RSV vaccine using shared clinical decision-making (SCDM)   Tetanus (Td) Vaccine - COST NOT COVERED BY MEDICARE PART B Following completion of primary series, a booster dose should be given every 10 years to maintain immunity against tetanus. Td may also be given as tetanus wound prophylaxis.   Tdap Vaccine - COST NOT COVERED BY MEDICARE PART B Recommended at least once for all adults. For pregnant patients, recommended with each pregnancy.   Shingles Vaccine (Shingrix) - COST NOT COVERED BY MEDICARE PART B  2 shot series recommended in those 19 years and older who have or will have weakened immune systems or those 50 years and older     Health Maintenance Due:      Topic Date Due    HIV Screening  Never done    Breast Cancer Screening: Mammogram  03/27/2025    Colorectal Cancer Screening  03/19/2030    Hepatitis C Screening  Completed     Immunizations Due:      Topic Date Due    COVID-19 Vaccine (7 - 2024-25 season) 09/01/2024     Advance Directives   What are advance directives?  Advance directives are legal documents that state your wishes and plans for medical care. These plans are made ahead of time in case you lose your ability to make decisions for yourself. Advance directives can apply to any medical decision, such as the treatments you want, and if you want to donate organs.   What are the types of advance directives?  There are many types of advance directives, and each state has rules about how to use them. You may choose a combination of any of the following:  Living will:  This is a written record of the treatment you want. You can also choose which treatments you do not want, which to limit, and which to stop at a certain time. This includes surgery, medicine, IV fluid, and tube feedings.   Durable power of  for healthcare (DPAHC):  This is a written record that states who you want to make healthcare  choices for you when you are unable to make them for yourself. This person, called a proxy, is usually a family member or a friend. You may choose more than 1 proxy.  Do not resuscitate (DNR) order:  A DNR order is used in case your heart stops beating or you stop breathing. It is a request not to have certain forms of treatment, such as CPR. A DNR order may be included in other types of advance directives.  Medical directive:  This covers the care that you want if you are in a coma, near death, or unable to make decisions for yourself. You can list the treatments you want for each condition. Treatment may include pain medicine, surgery, blood transfusions, dialysis, IV or tube feedings, and a ventilator (breathing machine).  Values history:  This document has questions about your views, beliefs, and how you feel and think about life. This information can help others choose the care that you would choose.  Why are advance directives important?  An advance directive helps you control your care. Although spoken wishes may be used, it is better to have your wishes written down. Spoken wishes can be misunderstood, or not followed. Treatments may be given even if you do not want them. An advance directive may make it easier for your family to make difficult choices about your care.       © Copyright [employer] 2018 Information is for End User's use only and may not be sold, redistributed or otherwise used for commercial purposes. All illustrations and images included in CareNotes® are the copyrighted property of [email or web address] or [employer]

## 2025-02-03 NOTE — ASSESSMENT & PLAN NOTE
Discussed preventative health, cancer screening, immunizations, and safety issues.  Last colonoscopy March 21, 2023 with recommendations recheck again in 7 years.  Last mammogram 3/27/2024, and she is getting again in March.  Patient had Tdap vaccination 2019.  Patient had both Shingrix shots.  Patient had the RSV vaccine.  I recommend yearly flu shot, patient already had this year.  I recommend Prevnar 20, patient already had this.  I recommend DEXA scan.    Eye chart exam done today, EKG done today shows Normal sinus rhythm at 60 bpm, normal axis, no ischemic changes  Orders:  •  POCT ECG

## 2025-02-03 NOTE — ASSESSMENT & PLAN NOTE
Continue atorvastatin, especially with elevated coronary artery calcium score, patient is going to follow with cardiology for cardiac catheterization

## 2025-02-03 NOTE — PROGRESS NOTES
Name: Julienne Allan      : 1960      MRN: [id number]  Encounter Provider: Raf Barrow MD  Encounter Date: 2/3/2025   Encounter department: [facility]    Assessment & Plan  Welcome to Medicare preventive visit  Discussed preventative health, cancer screening, immunizations, and safety issues.  Last colonoscopy 2023 with recommendations recheck again in 7 years.  Last mammogram 3/27/2024, and she is getting again in March.  Patient had Tdap vaccination .  Patient had both Shingrix shots.  Patient had the RSV vaccine.  I recommend yearly flu shot, patient already had this year.  I recommend Prevnar 20, patient already had this.  I recommend DEXA scan.    Eye chart exam done today, EKG done today shows Normal sinus rhythm at 60 bpm, normal axis, no ischemic changes  Orders:  •  POCT ECG    Stress incontinence of urine  Can try Kegels, discussed if having worsening symptoms, can do referral to urogyn       Encounter for screening for osteoporosis    Orders:  •  DXA bone density spine hip and pelvis; Future    Asymptomatic menopausal state    Orders:  •  DXA bone density spine hip and pelvis; Future    Mixed hyperlipidemia  Continue atorvastatin, especially with elevated coronary artery calcium score, patient is going to follow with cardiology for cardiac catheterization         Depression Screening and Follow-up Plan: Patient was screened for depression during today's encounter. They screened negative with a PHQ-2 score of 1.    Urinary Incontinence Plan of Care: counseling topics discussed: practice Kegel (pelvic floor strengthening) exercises and limiting fluid intake 3-4 hours before bed.       Preventive health issues were discussed with patient, and age appropriate screening tests were ordered as noted in patient's After Visit Summary. Personalized health advice and appropriate referrals for health education or preventive services given if needed, as noted in  patient's After Visit Summary.    History of Present Illness     Patient here for welcome to Medicare visit       Patient Care Team:  Raf Barrow MD as PCP - General (Internal Medicine)    Review of Systems  Medical History Reviewed by provider this encounter:  Tobacco  Allergies  Meds  Problems  Med Hx  Surg Hx  Fam Hx       Annual Wellness Visit Questionnaire   Julienne is here for her Welcome to Medicare visit.     Health Risk Assessment:   Patient rates overall health as good. Patient feels that their physical health rating is slightly worse. Patient is very satisfied with their life. Eyesight was rated as same. Hearing was rated as same. Patient feels that their emotional and mental health rating is same. Patients states they are sometimes angry. Patient states they are never, rarely unusually tired/fatigued. Pain experienced in the last 7 days has been some. Patient's pain rating has been 3/10. Patient states that she has experienced no weight loss or gain in last 6 months. My anxiety and strange pains are due to the heart issues that I am being treated for.    Depression Screening:   PHQ-2 Score: 1      Fall Risk Screening:   In the past year, patient has experienced: no history of falling in past year      Urinary Incontinence Screening:   Patient has leaked urine accidently in the last six months. I realize I need to urinate as soon as possible when I have the urge.    Home Safety:  Patient does not have trouble with stairs inside or outside of their home. Patient has working smoke alarms and has working carbon monoxide detector. Home safety hazards include: loose rugs on the floor. I have not had trouble but we do have throw rugs.    Nutrition:   Current diet is Regular, Low Cholesterol, Low Saturated Fat and No Added Salt. I am trying to avoid eating high potassium foods.  When we travel for work, I have less control over my diet.    Medications:   Patient is not currently taking any  over-the-counter supplements. Patient is able to manage medications.     Activities of Daily Living (ADLs)/Instrumental Activities of Daily Living (IADLs):   Walk and transfer into and out of bed and chair?: Yes  Dress and groom yourself?: Yes    Bathe or shower yourself?: Yes    Feed yourself? Yes  Do your laundry/housekeeping?: Yes  Manage your money, pay your bills and track your expenses?: Yes  Make your own meals?: Yes    Do your own shopping?: Yes    ADL comments: Learning to drive a new computerized car is a challenge.    Previous Hospitalizations:   Any hospitalizations or ED visits within the last 12 months?: No      Hospitalization Comments: I have a heart catheterizations schedule for Feb 7.    Advance Care Planning:   Living will: No    Durable POA for healthcare: No    Advanced directive: No    Advanced directive counseling given: Yes      Cognitive Screening:   Provider or family/friend/caregiver concerned regarding cognition?: No    PREVENTIVE SCREENINGS      Cardiovascular Screening:    General: Screening Not Indicated, History Lipid Disorder and Risks and Benefits Discussed      Diabetes Screening:     General: Screening Current and Risks and Benefits Discussed      Colorectal Cancer Screening:     General: Screening Current      Breast Cancer Screening:     General: Screening Current and Risks and Benefits Discussed      Cervical Cancer Screening:    General: Screening Not Indicated      Osteoporosis Screening:    General: Risks and Benefits Discussed      Abdominal Aortic Aneurysm (AAA) Screening:        General: Risks and Benefits Discussed and Screening Not Indicated      Lung Cancer Screening:     General: Screening Not Indicated and Risks and Benefits Discussed      Hepatitis C Screening:    General: Screening Current and Risks and Benefits Discussed    Screening, Brief Intervention, and Referral to Treatment (SBIRT)    Screening  Typical number of drinks in a day: 0  Typical number of drinks  "in a week: 0  Interpretation: Low risk drinking behavior.    AUDIT-C Screenin) How often did you have a drink containing alcohol in the past year? monthly or less  2) How many drinks did you have on a typical day when you were drinking in the past year? 1 to 2  3) How often did you have 6 or more drinks on one occasion in the past year? never    AUDIT-C Score: 1  Interpretation: Score 0-2 (female): Negative screen for alcohol misuse    Single Item Drug Screening:  How often have you used an illegal drug (including marijuana) or a prescription medication for non-medical reasons in the past year? never    Single Item Drug Screen Score: 0  Interpretation: Negative screen for possible drug use disorder    Brief Intervention  Alcohol & drug use screenings were reviewed. No concerns regarding substance use disorder identified.     Other Counseling Topics:   Car/seat belt/driving safety, skin self-exam and sunscreen.     Social Drivers of Health     Food Insecurity: No Food Insecurity (2025)    Hunger Vital Sign    • Worried About Running Out of Food in the Last Year: Never true    • Ran Out of Food in the Last Year: Never true   Transportation Needs: No Transportation Needs (2025)    PRAPARE - Transportation    • Lack of Transportation (Medical): No    • Lack of Transportation (Non-Medical): No   Housing Stability: Low Risk  (2025)    Housing Stability Vital Sign    • Unable to Pay for Housing in the Last Year: No    • Number of Times Moved in the Last Year: 0    • Homeless in the Last Year: No   Utilities: Not At Risk (2025)    [facility] Utilities    • Threatened with loss of utilities: No     No results found.    Objective   /70 (BP Location: Left arm, Patient Position: Sitting, Cuff Size: Standard)   Pulse 64   Ht 5' 4\" (1.626 m)   Wt 66 kg (145 lb 9.6 oz)   SpO2 100%   BMI 24.99 kg/m²     Physical Exam  Constitutional:       General: She is not in acute distress.     Appearance: Normal " appearance.   Eyes:      Comments: 20/20 vision left eye corrected  20/20 vision right eye corrected  20/20 vision in both eyes corrected   Neurological:      Mental Status: She is alert.

## 2025-02-06 ENCOUNTER — TELEPHONE (OUTPATIENT)
Age: 65
End: 2025-02-06

## 2025-02-06 NOTE — TELEPHONE ENCOUNTER
Pt called for clarification about her dye allergy medications for her CATH tomorrow. Reviewed with patient. Pt verbalized understanding.

## 2025-02-07 ENCOUNTER — HOSPITAL ENCOUNTER (OUTPATIENT)
Facility: HOSPITAL | Age: 65
Setting detail: OUTPATIENT SURGERY
Discharge: HOME | End: 2025-02-07
Attending: INTERNAL MEDICINE | Admitting: INTERNAL MEDICINE
Payer: COMMERCIAL

## 2025-02-07 VITALS
HEART RATE: 76 BPM | RESPIRATION RATE: 16 BRPM | DIASTOLIC BLOOD PRESSURE: 74 MMHG | HEIGHT: 64 IN | SYSTOLIC BLOOD PRESSURE: 122 MMHG | OXYGEN SATURATION: 93 % | TEMPERATURE: 98.1 F | WEIGHT: 145 LBS | BODY MASS INDEX: 24.75 KG/M2

## 2025-02-07 DIAGNOSIS — I97.630 POSTOPERATIVE HEMATOMA INVOLVING CIRCULATORY SYSTEM FOLLOWING CARDIAC CATHETERIZATION: ICD-10-CM

## 2025-02-07 DIAGNOSIS — Z95.5 STATUS POST INSERTION OF DRUG ELUTING CORONARY ARTERY STENT: Primary | ICD-10-CM

## 2025-02-07 DIAGNOSIS — R94.39 ABNORMAL NUCLEAR STRESS TEST: ICD-10-CM

## 2025-02-07 PROBLEM — I25.10 CORONARY ARTERY DISEASE INVOLVING NATIVE CORONARY ARTERY: Status: ACTIVE | Noted: 2025-02-07

## 2025-02-07 LAB
ATRIAL RATE: 64 BPM
ATRIAL RATE: 85 BPM
ERYTHROCYTE [DISTWIDTH] IN BLOOD BY AUTOMATED COUNT: 12.8 % (ref 11.6–15.1)
HCT VFR BLD AUTO: 39.5 % (ref 34.8–46.1)
HGB BLD-MCNC: 13 G/DL (ref 11.5–15.4)
KCT BLD-ACNC: 274 SEC (ref 89–137)
MCH RBC QN AUTO: 30 PG (ref 26.8–34.3)
MCHC RBC AUTO-ENTMCNC: 32.9 G/DL (ref 31.4–37.4)
MCV RBC AUTO: 91 FL (ref 82–98)
P AXIS: 54 DEGREES
P AXIS: 58 DEGREES
PLATELET # BLD AUTO: 306 THOUSANDS/UL (ref 149–390)
PMV BLD AUTO: 10.3 FL (ref 8.9–12.7)
PR INTERVAL: 156 MS
PR INTERVAL: 164 MS
QRS AXIS: 0 DEGREES
QRS AXIS: 13 DEGREES
QRSD INTERVAL: 74 MS
QRSD INTERVAL: 76 MS
QT INTERVAL: 366 MS
QT INTERVAL: 430 MS
QTC INTERVAL: 436 MS
QTC INTERVAL: 444 MS
RBC # BLD AUTO: 4.34 MILLION/UL (ref 3.81–5.12)
SPECIMEN SOURCE: ABNORMAL
T WAVE AXIS: 36 DEGREES
T WAVE AXIS: 52 DEGREES
VENTRICULAR RATE: 64 BPM
VENTRICULAR RATE: 85 BPM
WBC # BLD AUTO: 8.69 THOUSAND/UL (ref 4.31–10.16)

## 2025-02-07 PROCEDURE — C1874 STENT, COATED/COV W/DEL SYS: HCPCS | Performed by: INTERNAL MEDICINE

## 2025-02-07 PROCEDURE — NC001 PR NO CHARGE: Performed by: INTERNAL MEDICINE

## 2025-02-07 PROCEDURE — 92978 ENDOLUMINL IVUS OCT C 1ST: CPT | Performed by: INTERNAL MEDICINE

## 2025-02-07 PROCEDURE — C1769 GUIDE WIRE: HCPCS | Performed by: INTERNAL MEDICINE

## 2025-02-07 PROCEDURE — 85347 COAGULATION TIME ACTIVATED: CPT

## 2025-02-07 PROCEDURE — 93454 CORONARY ARTERY ANGIO S&I: CPT | Performed by: INTERNAL MEDICINE

## 2025-02-07 PROCEDURE — 99153 MOD SED SAME PHYS/QHP EA: CPT | Performed by: INTERNAL MEDICINE

## 2025-02-07 PROCEDURE — 99152 MOD SED SAME PHYS/QHP 5/>YRS: CPT | Performed by: INTERNAL MEDICINE

## 2025-02-07 PROCEDURE — 92928 PRQ TCAT PLMT NTRAC ST 1 LES: CPT | Performed by: INTERNAL MEDICINE

## 2025-02-07 PROCEDURE — C1753 CATH, INTRAVAS ULTRASOUND: HCPCS | Performed by: INTERNAL MEDICINE

## 2025-02-07 PROCEDURE — 85027 COMPLETE CBC AUTOMATED: CPT | Performed by: STUDENT IN AN ORGANIZED HEALTH CARE EDUCATION/TRAINING PROGRAM

## 2025-02-07 PROCEDURE — C9600 PERC DRUG-EL COR STENT SING: HCPCS | Performed by: INTERNAL MEDICINE

## 2025-02-07 PROCEDURE — C1725 CATH, TRANSLUMIN NON-LASER: HCPCS | Performed by: INTERNAL MEDICINE

## 2025-02-07 PROCEDURE — C1887 CATHETER, GUIDING: HCPCS | Performed by: INTERNAL MEDICINE

## 2025-02-07 PROCEDURE — NC001 PR NO CHARGE

## 2025-02-07 PROCEDURE — 93005 ELECTROCARDIOGRAM TRACING: CPT

## 2025-02-07 PROCEDURE — 93010 ELECTROCARDIOGRAM REPORT: CPT | Performed by: INTERNAL MEDICINE

## 2025-02-07 PROCEDURE — C1894 INTRO/SHEATH, NON-LASER: HCPCS | Performed by: INTERNAL MEDICINE

## 2025-02-07 DEVICE — STENT ONYXNG25018UX ONYX 2.50X18RX
Type: IMPLANTABLE DEVICE | Site: CORONARY | Status: FUNCTIONAL
Brand: ONYX FRONTIER™

## 2025-02-07 DEVICE — STENT ONYXNG30015UX ONYX 3.00X15RX
Type: IMPLANTABLE DEVICE | Site: CORONARY | Status: FUNCTIONAL
Brand: ONYX FRONTIER™

## 2025-02-07 RX ORDER — SODIUM CHLORIDE 9 MG/ML
125 INJECTION, SOLUTION INTRAVENOUS CONTINUOUS
Status: DISCONTINUED | OUTPATIENT
Start: 2025-02-07 | End: 2025-02-07 | Stop reason: HOSPADM

## 2025-02-07 RX ORDER — FENTANYL CITRATE 50 UG/ML
INJECTION, SOLUTION INTRAMUSCULAR; INTRAVENOUS CODE/TRAUMA/SEDATION MEDICATION
Status: DISCONTINUED | OUTPATIENT
Start: 2025-02-07 | End: 2025-02-07 | Stop reason: HOSPADM

## 2025-02-07 RX ORDER — NITROGLYCERIN 20 MG/100ML
INJECTION INTRAVENOUS CODE/TRAUMA/SEDATION MEDICATION
Status: DISCONTINUED | OUTPATIENT
Start: 2025-02-07 | End: 2025-02-07 | Stop reason: HOSPADM

## 2025-02-07 RX ORDER — HEPARIN SODIUM 1000 [USP'U]/ML
INJECTION, SOLUTION INTRAVENOUS; SUBCUTANEOUS CODE/TRAUMA/SEDATION MEDICATION
Status: DISCONTINUED | OUTPATIENT
Start: 2025-02-07 | End: 2025-02-07 | Stop reason: HOSPADM

## 2025-02-07 RX ORDER — ASPIRIN 81 MG/1
81 TABLET, CHEWABLE ORAL DAILY
Start: 2025-02-07

## 2025-02-07 RX ORDER — LIDOCAINE HYDROCHLORIDE 10 MG/ML
INJECTION, SOLUTION EPIDURAL; INFILTRATION; INTRACAUDAL; PERINEURAL CODE/TRAUMA/SEDATION MEDICATION
Status: DISCONTINUED | OUTPATIENT
Start: 2025-02-07 | End: 2025-02-07 | Stop reason: HOSPADM

## 2025-02-07 RX ORDER — MIDAZOLAM HYDROCHLORIDE 2 MG/2ML
INJECTION, SOLUTION INTRAMUSCULAR; INTRAVENOUS CODE/TRAUMA/SEDATION MEDICATION
Status: DISCONTINUED | OUTPATIENT
Start: 2025-02-07 | End: 2025-02-07 | Stop reason: HOSPADM

## 2025-02-07 RX ORDER — ASPIRIN 81 MG/1
324 TABLET, CHEWABLE ORAL ONCE
Status: COMPLETED | OUTPATIENT
Start: 2025-02-07 | End: 2025-02-07

## 2025-02-07 RX ORDER — HYDROCORTISONE SODIUM SUCCINATE 100 MG/2ML
INJECTION INTRAMUSCULAR; INTRAVENOUS CODE/TRAUMA/SEDATION MEDICATION
Status: DISCONTINUED | OUTPATIENT
Start: 2025-02-07 | End: 2025-02-07 | Stop reason: HOSPADM

## 2025-02-07 RX ORDER — ASPIRIN 81 MG/1
324 TABLET, CHEWABLE ORAL ONCE
Status: DISCONTINUED | OUTPATIENT
Start: 2025-02-07 | End: 2025-02-07 | Stop reason: SDUPTHER

## 2025-02-07 RX ORDER — SODIUM CHLORIDE 9 MG/ML
200 INJECTION, SOLUTION INTRAVENOUS CONTINUOUS
Status: DISPENSED | OUTPATIENT
Start: 2025-02-07 | End: 2025-02-07

## 2025-02-07 RX ORDER — VERAPAMIL HYDROCHLORIDE 2.5 MG/ML
INJECTION, SOLUTION INTRAVENOUS CODE/TRAUMA/SEDATION MEDICATION
Status: DISCONTINUED | OUTPATIENT
Start: 2025-02-07 | End: 2025-02-07 | Stop reason: HOSPADM

## 2025-02-07 RX ORDER — CLOPIDOGREL BISULFATE 75 MG/1
75 TABLET ORAL DAILY
Qty: 90 TABLET | Refills: 3 | Status: SHIPPED | OUTPATIENT
Start: 2025-02-07 | End: 2025-02-19 | Stop reason: CLARIF

## 2025-02-07 RX ORDER — PRASUGREL 10 MG/1
TABLET, FILM COATED ORAL CODE/TRAUMA/SEDATION MEDICATION
Status: DISCONTINUED | OUTPATIENT
Start: 2025-02-07 | End: 2025-02-07 | Stop reason: HOSPADM

## 2025-02-07 RX ADMIN — ASPIRIN 81 MG CHEWABLE TABLET 324 MG: 81 TABLET CHEWABLE at 07:59

## 2025-02-07 RX ADMIN — SODIUM CHLORIDE 125 ML/HR: 0.9 INJECTION, SOLUTION INTRAVENOUS at 07:59

## 2025-02-07 NOTE — H&P
H&P - Cardiology   Julienne Allan 64 y.o. female MRN: [id number]  Unit/Bed#: [facility] CATH LAB ROOM Encounter: [id number]  02/07/25  8:41 AM      Assessment/ Plan:  High coronary calcium score--11/6/2024 total coronary calcium score 458.  Patient was referred to cardiology for further evaluation.  Nuclear stress test reviewed horizontal ST depression in V5 and small in size with mild reduction in uptake in mid anterior location that is reversible.    2.  Hyperlipidemia--LDL was 206, on atorvastatin 40 mg p.o. daily, currently LDL 29 on 1/14/2025    3.  Family history of early CAD    Patient seen and examined at bedside.    Brief overview of procedure discussed with patient.  Indication, risk, benefits and alternatives discussed with patient who verbalized understanding.  All questions addressed fully.  Patient elected to proceed with planned procedure, consent completed.    Patient remains clinically stable.    We will proceed with planned procedure.      History of Present Illness     HPI: Julienne Allan is a 64 y.o. year old female with a past Mextra of hyperlipidemia, family history of early CAD who presents to the Cath Lab for left heart cath due to abnormal stress test.  Patient states that she turned to 64 and PCP ordered coronary calcium score due to family history of CAD.  Total coronary calcium score was 458.  Patient was referred to cardiology for further evaluation.  Nuclear stress test revealed ST depression in V5 and small in size with mild reduction in uptake in mid anterior locations that is reversible.  Patient is further referred to Cath Lab for left heart cath.  Currently patient denies chest pain, shortness of breath, nausea or vomiting.  No fever.  Creatinine 0.85, LDL 29.      Review of Systems   Constitutional:  Negative for chills and fever.   HENT:  Negative for congestion, rhinorrhea, sneezing and sore throat.    Eyes:  Negative for pain and discharge.   Respiratory:  Negative for cough, chest  tightness, shortness of breath and wheezing.    Cardiovascular:  Negative for chest pain and leg swelling.   Gastrointestinal:  Negative for abdominal pain, nausea and vomiting.   Endocrine: Negative for polydipsia, polyphagia and polyuria.   Genitourinary:  Negative for flank pain, frequency and urgency.   Musculoskeletal:  Negative for arthralgias, back pain and joint swelling.   Skin:  Negative for color change and pallor.   Neurological:  Negative for dizziness, weakness, light-headedness and headaches.   Psychiatric/Behavioral:  Negative for agitation and confusion.        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    Asthma 1985    Dengue fever 2000    HL (hearing loss) 2001    Hear my heart beat of chirping that interrupts hearing    Malaria 1991    Miscarriage     Otitis media 2012    once    Shingles 2004    Tonsil stone     2023    Visual impairment 2019 - flashing lights    astigmatism and short sighted since childhood, flashing ligh     Past Surgical History:   Procedure Laterality Date    DILATION AND CURETTAGE OF UTERUS      x2    NO PAST SURGERIES       Social History     Substance and Sexual Activity   Alcohol Use Not Currently    Comment: very occassional     Social History     Substance and Sexual Activity   Drug Use Never     Social History     Tobacco Use   Smoking Status Never   Smokeless Tobacco Never       Family History:   Family History   Problem Relation Age of Onset    Coronary artery disease Mother     Hypertension Mother     Heart disease Mother         by-pass surgery    Asthma Mother     Coronary artery disease Father     Dementia Father     Heart disease Father         by-pass surgery    Hearing loss Father     Thyroid cancer Sister 50    Skin cancer Sister     No Known Problems Sister     Thyroid disease Sister         removed for cancer    Cancer Sister     Arthritis Maternal  "Grandmother     Vision loss Maternal Grandmother         macular degeneration    Coronary artery disease Maternal Grandfather     Heart disease Maternal Grandfather     No Known Problems Paternal Grandmother     Coronary artery disease Paternal Grandfather     Heart disease Paternal Grandfather     No Known Problems Maternal Aunt     No Known Problems Paternal Aunt     Heart disease Paternal Aunt     Breast cancer Neg Hx        Meds/Allergies   all current active meds have been reviewed  Allergies   Allergen Reactions    Contrast Dye [Iodinated Contrast Media] Anaphylaxis     Per patient, upon contrast administration she felt she had an asthma attack and stopped breathing. After that it gets fuzzy for the patient in recall of treatment. ALICIA RN     Molds & Smuts Shortness Of Breath    Tetanus-Diphth-Acell Pertussis      Itching, redness, swelling, pain at injection site    Typhoid Vaccines Dermatitis    Microplegia Msa-Msg [Plegisol] Headache    Sulfa Antibiotics Vomiting, Fever and Wheezing       Objective   Vitals: Blood pressure 131/70, pulse 82, temperature 98.1 °F (36.7 °C), temperature source Temporal, resp. rate 16, height 5' 4\" (1.626 m), weight 65.8 kg (145 lb), SpO2 98%, not currently breastfeeding., Body mass index is 24.89 kg/m².,   Orthostatic Blood Pressures      Flowsheet Row Most Recent Value   Blood Pressure 131/70 filed at 2025 0811   Patient Position - Orthostatic VS Lying filed at 2025 0811            Systolic (24hrs), Av , Min:131 , Max:131     Diastolic (24hrs), Av, Min:70, Max:70      No intake or output data in the 24 hours ending 25 0841    Invasive Devices       Peripheral Intravenous Line  Duration             Peripheral IV 25 Left Antecubital <1 day                      Physical Exam  Constitutional:       General: She is not in acute distress.     Appearance: She is well-developed. She is not diaphoretic.   HENT:      Head: Normocephalic.      " Mouth/Throat:      Pharynx: No oropharyngeal exudate.   Eyes:      Pupils: Pupils are equal, round, and reactive to light.   Cardiovascular:      Rate and Rhythm: Normal rate and regular rhythm.      Heart sounds: No murmur heard.  Pulmonary:      Effort: Pulmonary effort is normal. No respiratory distress.      Breath sounds: No wheezing or rales.   Abdominal:      General: Bowel sounds are normal.      Palpations: Abdomen is soft.      Tenderness: There is no abdominal tenderness.   Musculoskeletal:         General: No tenderness. Normal range of motion.      Cervical back: Normal range of motion.   Skin:     General: Skin is warm.   Neurological:      Mental Status: She is alert and oriented to person, place, and time.           Lab Results:     Troponins:       CBC with diff:   Results from last 7 days   Lab Units 02/07/25  0759   WBC Thousand/uL 8.69   HEMOGLOBIN g/dL 13.0   HEMATOCRIT % 39.5   MCV fL 91   PLATELETS Thousands/uL 306   RBC Million/uL 4.34   MCH pg 30.0   MCHC g/dL 32.9   RDW % 12.8   MPV fL 10.3                   Previous STRESS TEST:  No results found for this or any previous visit.     Results for orders placed during the hospital encounter of 12/31/24    NM myocardial perfusion spect (stress and/or rest)    Interpretation Summary    Stress ECG: Horizontal ST depression of 1.0 mm (V5) is noted. ST depression began at minute 8:50 of stress and ended at minute 0:50 of recovery. ST deviation returned to baseline after less than 1 minute of recovery. There were no arrhythmias during recovery. . The ECG was negative for ischemia. The stress ECG is negative for ischemia after maximal exercise, without reproduction of symptoms.    Perfusion: There is a left ventricular perfusion defect that is small in size with mild reduction in uptake present in the mid anterior location(s) that is reversible.    Stress Combined Conclusion: The ECG and SPECT imaging portions of the stress study are discordant but  are nonetheless concerning for inducible myocardial ischemia given the known limited sensitivity of stress electrocardiography. Left ventricular perfusion is probably normal. Findings are consistent with ischemia.    Excellent exercise capacity for age. Small, mild reversible defect on the anterior wall which may represent a small area of ischemia.    No results found for this or any previous visit.

## 2025-02-07 NOTE — DISCHARGE INSTR - AVS FIRST PAGE
1. Please see the post angioplasty discharge instructions.   No heavy lifting, greater than 10 lbs. or strenuous activity for 5 days.  Follow angioplasty discharge instructions.    2.Remove band aid tomorrow.  Shower and wash area- wrist gently with soap and water- beginning tomorrow. Rinse and pat dry.  Apply new water seal band aid.  Repeat this process for 5 days. No powders, creams lotions or antibiotic ointments  for 5 days.  No tub baths, hot tubs or swimming for 5 days.     3. Call [facility] Cardiology Office ([phone]) if you develop a fever, redness or drainage at your wrist access site.      4. No driving for 2 days    5. Do not stop aspirin or Plavix (clopiogrel) any reason without a cardiologist’s consent, or the stent could block up and cause a heart attack.    6. Stent card and book.

## 2025-02-07 NOTE — DISCHARGE SUMMARY
Same Day PCI Discharge Summary Note    Date of Admission:2/7/2025    Date of Discharge:2/7/2025    Admitting diagnosis:  Elevated coronary calcium score (11/6/2024)  -Total calcium score 458.  -Family history of premature coronary artery disease, father and sister had CABG at age 60    Secondary Diagnosis:  Asthma  -Home Rx albuterol 2 puffs every 6 hours as needed    Hyperlipidemia  -Cholesterol 118, triglycerides 117, HDL 60 and LDL 29  -Home Rx atorvastatin 40 mg daily    Dengue fever (2000)    Malaria (1991)      Discharge Diagnosis: Coronary artery disease status post successful OCT guided PCI/PRUDENCIO of of proximal and mid LAD.      PCP:Raf Barrow MD      Office Cardiologist: Dr. Parr    Interventional Cardiologist: Dr. White    [facility] Course:   Julienne Allan, a 64-year-old female, presented to [facility] for outpatient elective cardiac catheterization for evaluation of elevated coronary artery calcium score by cardiac CT. in addition she has significant family history of premature coronary artery disease with both her father and sister reporting open heart surgery at age of 60.  At present she denies chest pain, orthopnea, PND, lower extremity edema, palpitations or dizziness.  She is very active and exercise multiple times per week without symptoms.    Past medical history is significant for hyperlipidemia, dengue fever (2000) and malaria (1991).  No history of cigarette smoking alcohol abuse or illicit drug abuse.    Cardiac catheterization was performed via right radial artery..  During cardiac catheterization, she was loaded with aspirin 324 mg and Effient 60 mg.  Findings: Triple-vessel coronary artery disease.  Mid RCA 40% stenosis.  Right PDA 40% stenosis.  Left circumflex artery 40% stenosis.  % stenosis.  Proximal LAD 70% stenosis.  Mid LAD 85% stenosis.  Distal LAD 60% stenosis.  Status post successful OCT guided PCI and placement of [place]bury 3 x 15 mm drug-eluting stent to proximal LAD  lesion, with 0% residual stenosis postintervention.  Status post PCI/placement of Asher 2.5 X18 mm drug-eluting stents to mid LAD lesion.  There is 0% residual stenosis postintervention.  During prolonged inflations there was significant ST segment changes without anginal symptoms, representative of silent ischemia.    Discharge plan  Dual antiplatelet therapy with aspirin 81 mg daily and Plavix 75 mg daily  Referral to cardiac rehab  Aggressive risk factor modification,  Continue atorvastatin 40 mg daily   BMP and CBC to be performed tomorrow  Follow-up appointment is scheduled for 2/18/2025 at 5:20 PM in the [place] office              Criteria for same day discharge:    Patient underwent, uncomplicated PCI /stent on 2/7/2025  without hemodynamic complications or instability per institutional protocol.    Patient will be accessed and discharged later today specifically noting the following criteria:    Uncomplicated procedure as above before above.    Patient is less than 80 years old with preserved LV function and no significant valvulopathy  Creatinine is less than 1.5  Preserved LV function  Patient has been observed for more than 4 hours and they live within 30 miles of 1 of our network hospital.  Patient will have a family member or friend stay with patient overnight.    Patient and family are agreeable to this discharge.      Discharge Statement:  I have spent 45 minutes discharging this patient.  This time was spent on the day of discharge.  I had direct contact with the patient on the day of discharge.      **Please note Fluency direct dictation voice to text software has been used in the creation of this document**

## 2025-02-08 ENCOUNTER — APPOINTMENT (OUTPATIENT)
Dept: LAB | Age: 65
End: 2025-02-08
Payer: COMMERCIAL

## 2025-02-08 DIAGNOSIS — Z95.5 STATUS POST INSERTION OF DRUG ELUTING CORONARY ARTERY STENT: ICD-10-CM

## 2025-02-08 DIAGNOSIS — I97.630 POSTOPERATIVE HEMATOMA INVOLVING CIRCULATORY SYSTEM FOLLOWING CARDIAC CATHETERIZATION: ICD-10-CM

## 2025-02-08 LAB
ANION GAP SERPL CALCULATED.3IONS-SCNC: 9 MMOL/L (ref 4–13)
BUN SERPL-MCNC: 13 MG/DL (ref 5–25)
CALCIUM SERPL-MCNC: 9.7 MG/DL (ref 8.4–10.2)
CHLORIDE SERPL-SCNC: 104 MMOL/L (ref 96–108)
CO2 SERPL-SCNC: 26 MMOL/L (ref 21–32)
CREAT SERPL-MCNC: 0.8 MG/DL (ref 0.6–1.3)
ERYTHROCYTE [DISTWIDTH] IN BLOOD BY AUTOMATED COUNT: 13.3 % (ref 11.6–15.1)
GFR SERPL CREATININE-BSD FRML MDRD: 77 ML/MIN/1.73SQ M
GLUCOSE P FAST SERPL-MCNC: 88 MG/DL (ref 65–99)
HCT VFR BLD AUTO: 37.5 % (ref 34.8–46.1)
HGB BLD-MCNC: 12 G/DL (ref 11.5–15.4)
MCH RBC QN AUTO: 30 PG (ref 26.8–34.3)
MCHC RBC AUTO-ENTMCNC: 32 G/DL (ref 31.4–37.4)
MCV RBC AUTO: 94 FL (ref 82–98)
PLATELET # BLD AUTO: 294 THOUSANDS/UL (ref 149–390)
PMV BLD AUTO: 10.9 FL (ref 8.9–12.7)
POTASSIUM SERPL-SCNC: 5 MMOL/L (ref 3.5–5.3)
RBC # BLD AUTO: 4 MILLION/UL (ref 3.81–5.12)
SODIUM SERPL-SCNC: 139 MMOL/L (ref 135–147)
WBC # BLD AUTO: 18.1 THOUSAND/UL (ref 4.31–10.16)

## 2025-02-08 PROCEDURE — 36415 COLL VENOUS BLD VENIPUNCTURE: CPT

## 2025-02-08 PROCEDURE — 80048 BASIC METABOLIC PNL TOTAL CA: CPT

## 2025-02-08 PROCEDURE — 85027 COMPLETE CBC AUTOMATED: CPT

## 2025-02-17 NOTE — PROGRESS NOTES
Cardiology Follow Up    Julienne Allan  1960  [id number]  [facility] [place]  [address]  [place] [address] [phone]      Assessment & Plan  S/P drug eluting coronary stent placement  2/07/24 sp S/p post [place] frontier drug-eluting stent across the 70% stenosis in the proximal LAD, 0% residual stenosis.  This post [place] frontier drug-eluting stent across the 85% stenosis of the mid LAD.  0% residual stenosis  Continue on aspirin 81 mg daily, Plavix 75 mg daily.  She is aware to continue for 1 year not stop for any reason.  Continue Lipitor 40 mg daily.  EKG sinus bradycardia 52 bpm.  Coronary artery disease of native artery of native heart with stable angina pectoris (HCC)  [facility] On 2/07/24    Dist LAD lesion is 60% stenosed.    3rd Diag lesion is 50% stenosed.    Mid Cx lesion is 40% stenosed.    3rd Mrg lesion is 70% stenosed.    Mid RCA lesion is 40% stenosed.    RPDA lesion is 40% stenosed.istory:   Complaining of midsternal chest tightness with breathlessness with walking/exertion  Continue on aspirin 81 mg daily, Plavix 75 mg daily, Lipitor 40 mg daily.  TTE to be done in the near future  I will discuss symptoms with Dr. Espinosa and Dr. White for further evaluation   Mixed hyperlipidemia  1/14/25   HDL 60 LDL35  Continue Lipitor 40 mg daily, heart healthy diet        History     On 12/31/24 Julienne underwent a nuclear stress test which showed small mild reversible defect of the anterior wall which may represent small area of ischemia.  She was scheduled to undergo [facility]     2/07/24 cardiac catheterization showed:   Prox LAD lesion is 70% stenosed.    Mid LAD lesion is 85% stenosed.    Dist LAD lesion is 60% stenosed.    3rd Diag lesion is 50% stenosed.    Mid Cx lesion is 40% stenosed.    3rd Mrg lesion is 70% stenosed.    Mid RCA lesion is 40% stenosed.    RPDA lesion is 40% stenosed  S/p post [place] frontier  drug-eluting stent across the 70% stenosis in the proximal LAD, 0% residual stenosis.  This post Asher frontier drug-eluting stent across the 85% stenosis of the mid LAD.  0% residual stenosis  She was discharged on Plavix 75mg daily, ASA 81mg daily and Lipitor 40mg daily.      Julienne presents her office for recent hospitalization follow-up visit.  She is accompanied by her .  Prior to her cardiac catheterization she was exercising with HIT intervals.  Julienne has been walking with symptoms of mid sternal chest tightness associated with breathlessness.  She admits to occasional stabbing left sided chest pains.            Medical History   Primary Cardiologist Dr Brooks  Coronary calcium score 458  Hyperlipidemia 1/14/25   HDL 60 LDL 35  Dungue fever in 2000  Malaria 1991  Sig Family hx of early CAD    Patient Active Problem List   Diagnosis    Mixed hyperlipidemia    Welcome to Medicare preventive visit    Family history of early CAD    Visual disturbance    Chronic right-sided low back pain without sciatica    Adverse reaction to diphtheria and tetanus vaccine    Hyperkalemia    Vitamin D deficiency    COVID-19 virus infection    Stress incontinence of urine    Coronary artery disease involving native coronary artery    Status post insertion of drug eluting coronary artery stent     Past Medical History:   Diagnosis Date    Allergic 1984    airborne mold and sulfadrugs    Arthritis 2015    in the hands    Asthma 1985    Dengue fever 2000    HL (hearing loss) 2001    Hear my heart beat of chirping that interrupts hearing    Malaria 1991    Miscarriage     Otitis media 2012    once    Shingles 2004    Tonsil stone     2023    Visual impairment 2019 - flashing lights    astigmatism and short sighted since childhood, flashing ligh     Social History     Socioeconomic History    Marital status: /Civil Union     Spouse name: Not on file    Number of children: Not on file    Years of education: Not  on file    Highest education level: Not on file   Occupational History    Not on file   Tobacco Use    Smoking status: Never    Smokeless tobacco: Never   Vaping Use    Vaping status: Never Used   Substance and Sexual Activity    Alcohol use: Not Currently     Comment: very occassional    Drug use: Never    Sexual activity: Yes     Partners: Male     Birth control/protection: Post-menopausal     Comment: took pills as a young woman   Other Topics Concern    Not on file   Social History Narrative     no children    Works as [employer] , travels alot     Social Drivers of Health     Financial Resource Strain: Not on file   Food Insecurity: No Food Insecurity (1/29/2025)    Hunger Vital Sign     Worried About Running Out of Food in the Last Year: Never true     Ran Out of Food in the Last Year: Never true   Transportation Needs: No Transportation Needs (1/29/2025)    PRAPARE - Transportation     Lack of Transportation (Medical): No     Lack of Transportation (Non-Medical): No   Physical Activity: Not on file   Stress: Not on file   Social Connections: Not on file   Intimate Partner Violence: Not on file   Housing Stability: Low Risk  (1/29/2025)    Housing Stability Vital Sign     Unable to Pay for Housing in the Last Year: No     Number of Times Moved in the Last Year: 0     Homeless in the Last Year: No      Family History   Problem Relation Age of Onset    Coronary artery disease Mother     Hypertension Mother     Heart disease Mother         by-pass surgery    Asthma Mother     Coronary artery disease Father     Dementia Father     Heart disease Father         by-pass surgery    Hearing loss Father     Thyroid cancer Sister 50    Skin cancer Sister     No Known Problems Sister     Thyroid disease Sister         removed for cancer    Cancer Sister     Arthritis Maternal Grandmother     Vision loss Maternal Grandmother         macular degeneration    Coronary artery disease Maternal Grandfather     Heart  disease Maternal Grandfather     No Known Problems Paternal Grandmother     Coronary artery disease Paternal Grandfather     Heart disease Paternal Grandfather     No Known Problems Maternal Aunt     No Known Problems Paternal Aunt     Heart disease Paternal Aunt     Breast cancer Neg Hx      Past Surgical History:   Procedure Laterality Date    CARDIAC CATHETERIZATION Left 2/7/2025    Procedure: Cardiac Left Heart Cath;  Surgeon: Hermes White MD;  Location: [facility];  Service: Cardiology    CARDIAC CATHETERIZATION N/A 2/7/2025    Procedure: Cardiac PCI;  Surgeon: Hermes White MD;  Location: [facility];  Service: Cardiology    DILATION AND CURETTAGE OF UTERUS      x2    NO PAST SURGERIES         Current Outpatient Medications:     albuterol (PROVENTIL HFA,VENTOLIN HFA) 90 mcg/act inhaler, Inhale 2 puffs every 6 (six) hours as needed for wheezing, Disp: , Rfl:     aspirin 81 mg chewable tablet, Chew 1 tablet (81 mg total) daily, Disp: , Rfl:     atorvastatin (LIPITOR) 40 mg tablet, Take 1 tablet (40 mg total) by mouth daily, Disp: 100 tablet, Rfl: 3    clopidogrel (PLAVIX) 75 mg tablet, Take 1 tablet (75 mg total) by mouth daily, Disp: 90 tablet, Rfl: 3    VITAMIN D PO, Take 25 mg by mouth in the morning, Disp: , Rfl:   Allergies   Allergen Reactions    Contrast Dye [Iodinated Contrast Media] Anaphylaxis     Per patient, upon contrast administration she felt she had an asthma attack and stopped breathing. After that it gets fuzzy for the patient in recall of treatment. ALICIA RN 1/24/285    Molds & Smuts Shortness Of Breath    Tetanus-Diphth-Acell Pertussis      Itching, redness, swelling, pain at injection site    Typhoid Vaccines Dermatitis    Microplegia Msa-Msg [Plegisol] Headache    Sulfa Antibiotics Vomiting, Fever and Wheezing       Labs:  Appointment on 02/08/2025   Component Date Value    Sodium 02/08/2025 139     Potassium 02/08/2025 5.0     Chloride 02/08/2025 104     CO2 02/08/2025  26     ANION GAP 02/08/2025 9     BUN 02/08/2025 13     Creatinine 02/08/2025 0.80     Glucose, Fasting 02/08/2025 88     Calcium 02/08/2025 9.7     eGFR 02/08/2025 77     WBC 02/08/2025 18.10 (H)     RBC 02/08/2025 4.00     Hemoglobin 02/08/2025 12.0     Hematocrit 02/08/2025 37.5     MCV 02/08/2025 94     MCH 02/08/2025 30.0     MCHC 02/08/2025 32.0     RDW 02/08/2025 13.3     Platelets 02/08/2025 294     MPV 02/08/2025 10.9    Admission on 02/07/2025, Discharged on 02/07/2025   Component Date Value    WBC 02/07/2025 8.69     RBC 02/07/2025 4.34     Hemoglobin 02/07/2025 13.0     Hematocrit 02/07/2025 39.5     MCV 02/07/2025 91     MCH 02/07/2025 30.0     MCHC 02/07/2025 32.9     RDW 02/07/2025 12.8     Platelets 02/07/2025 306     MPV 02/07/2025 10.3     Activated Clotting Time,* 02/07/2025 274 (H)     Specimen Type 02/07/2025 VENOUS     Ventricular Rate 02/07/2025 85     Atrial Rate 02/07/2025 85     DE Interval 02/07/2025 156     QRSD Interval 02/07/2025 74     QT Interval 02/07/2025 366     QTC Interval 02/07/2025 436     P Axis 02/07/2025 54     QRS Axis 02/07/2025 0     T Wave [place] 02/07/2025 52     Ventricular Rate 02/07/2025 64     Atrial Rate 02/07/2025 64     DE Interval 02/07/2025 164     QRSD Interval 02/07/2025 76     QT Interval 02/07/2025 430     QTC Interval 02/07/2025 444     P Axis 02/07/2025 58     QRS [place] 02/07/2025 13     T Wave [place] 02/07/2025 36    Appointment on 01/14/2025   Component Date Value    WBC 01/14/2025 7.62     RBC 01/14/2025 4.44     Hemoglobin 01/14/2025 13.4     Hematocrit 01/14/2025 41.5     MCV 01/14/2025 94     MCH 01/14/2025 30.2     MCHC 01/14/2025 32.3     RDW 01/14/2025 13.6     MPV 01/14/2025 10.8     Platelets 01/14/2025 335     nRBC 01/14/2025 0     Segmented % 01/14/2025 50     Immature Grans % 01/14/2025 0     Lymphocytes % 01/14/2025 39     Monocytes % 01/14/2025 8     Eosinophils Relative 01/14/2025 2     Basophils Relative 01/14/2025 1     Absolute Neutrophils  01/14/2025 3.77     Absolute Immature Grans 01/14/2025 0.01     Absolute Lymphocytes 01/14/2025 3.00     Absolute Monocytes 01/14/2025 0.58     Eosinophils Absolute 01/14/2025 0.18     Basophils Absolute 01/14/2025 0.08     Sodium 01/14/2025 140     Potassium 01/14/2025 5.5 (H)     Chloride 01/14/2025 104     CO2 01/14/2025 30     ANION GAP 01/14/2025 6     BUN 01/14/2025 12     Creatinine 01/14/2025 0.85     Glucose, Fasting 01/14/2025 92     Calcium 01/14/2025 9.6     AST 01/14/2025 23     ALT 01/14/2025 24     Alkaline Phosphatase 01/14/2025 88     Total Protein 01/14/2025 7.2     Albumin 01/14/2025 4.3     Total Bilirubin 01/14/2025 0.38     eGFR 01/14/2025 72     Cholesterol 01/14/2025 118     Triglycerides 01/14/2025 117     HDL, Direct 01/14/2025 60     LDL Calculated 01/14/2025 35     TSH 3RD GENERATON 01/14/2025 3.971     Vit D, 25-Hydroxy 01/14/2025 30.4     LDL Direct 01/14/2025 29    Hospital Outpatient Visit on 12/31/2024   Component Date Value    Baseline HR 12/31/2024 67     Baseline BP 12/31/2024 130/84     O2 sat rest 12/31/2024 99     Stress peak HR 12/31/2024 166     Post peak BP 12/31/2024 164     O2 sat peak 12/31/2024 97     Recovery HR 12/31/2024 90     Recovery BP 12/31/2024 132/80     O2 sat recovery 12/31/2024 98     Max HR 12/31/2024 166     Max HR Percent 12/31/2024 107     Exercise duration (min) 12/31/2024 12     Exercise duration (sec) 12/31/2024 0     Estimated workload 12/31/2024 13.4     Rate Pressure Product 12/31/2024 27,224.0     Angina Index 12/31/2024 0     Stress Stage Reached 12/31/2024 4.0     Rest Nuclear Isotope Dose 12/31/2024 10.60     Stress Nuclear Isotope D* 12/31/2024 32.60     Hassan Treadmill Score 12/31/2024 7     ST Depression (mm) 12/31/2024 1.0     Stress/rest perfusion ra* 12/31/2024 1.03     EF (%) 12/31/2024 67     Protocol Name 12/31/2024 ANTOINE     Exercise duration (min) 12/31/2024 12     Exercise duration (sec) 12/31/2024 0     Post Peak Systolic BP  12/31/2024 164     Max Diastolic Bp 12/31/2024 78     Peak HR 12/31/2024 166     Max Predicted Heart Rate 12/31/2024 155     Reason for Termination 12/31/2024 Target Heart Rate Achieved     Test Indication 12/31/2024 Screening for CAD     Target Hr Formular 12/31/2024 (220 - Age)*100%     Chest Pain Statement 12/31/2024 none      Imaging: Cardiac catheterization  Result Date: 2/7/2025  Narrative:   Prox LAD lesion is 70% stenosed.   Mid LAD lesion is 85% stenosed.   Dist LAD lesion is 60% stenosed.   3rd Diag lesion is 50% stenosed.   Mid Cx lesion is 40% stenosed.   3rd Mrg lesion is 70% stenosed.   Mid RCA lesion is 40% stenosed.   RPDA lesion is 40% stenosed. 3v CAD PCI p-mLAD Silent ischemia, no angina during prolonged inflation with significant ST segment changes       Review of Systems:  Review of Systems   Respiratory:  Positive for shortness of breath.         Per HPI   Cardiovascular:  Positive for chest pain.        Per HPI   All other systems reviewed and are negative.      Physical Exam:  Physical Exam  Vitals reviewed.   Constitutional:       Appearance: Normal appearance.   HENT:      Head: Normocephalic.   Cardiovascular:      Rate and Rhythm: Normal rate and regular rhythm.      Pulses: Normal pulses.      Heart sounds: Normal heart sounds.   Pulmonary:      Effort: Pulmonary effort is normal.      Breath sounds: Normal breath sounds.   Musculoskeletal:         General: Normal range of motion.      Cervical back: Normal range of motion and neck supple.      Right lower leg: No edema.      Left lower leg: No edema.   Skin:     General: Skin is warm and dry.      Capillary Refill: Capillary refill takes less than 2 seconds.      Comments: Right radial cath site appears healed   Neurological:      General: No focal deficit present.      Mental Status: She is alert and oriented to person, place, and time.   Psychiatric:         Mood and Affect: Mood normal.         Behavior: Behavior normal.

## 2025-02-18 ENCOUNTER — OFFICE VISIT (OUTPATIENT)
Dept: CARDIOLOGY CLINIC | Facility: CLINIC | Age: 65
End: 2025-02-18
Payer: COMMERCIAL

## 2025-02-18 VITALS
OXYGEN SATURATION: 98 % | WEIGHT: 145.3 LBS | DIASTOLIC BLOOD PRESSURE: 76 MMHG | SYSTOLIC BLOOD PRESSURE: 138 MMHG | BODY MASS INDEX: 24.81 KG/M2 | HEIGHT: 64 IN | HEART RATE: 53 BPM

## 2025-02-18 DIAGNOSIS — I25.118 CORONARY ARTERY DISEASE OF NATIVE ARTERY OF NATIVE HEART WITH STABLE ANGINA PECTORIS (HCC): ICD-10-CM

## 2025-02-18 DIAGNOSIS — Z95.5 S/P DRUG ELUTING CORONARY STENT PLACEMENT: Primary | ICD-10-CM

## 2025-02-18 DIAGNOSIS — E78.2 MIXED HYPERLIPIDEMIA: ICD-10-CM

## 2025-02-18 PROCEDURE — 93000 ELECTROCARDIOGRAM COMPLETE: CPT | Performed by: NURSE PRACTITIONER

## 2025-02-18 PROCEDURE — 99214 OFFICE O/P EST MOD 30 MIN: CPT | Performed by: NURSE PRACTITIONER

## 2025-02-18 RX ORDER — NITROGLYCERIN 0.4 MG/1
0.4 TABLET SUBLINGUAL
Qty: 20 TABLET | Refills: 3 | Status: SHIPPED | OUTPATIENT
Start: 2025-02-18

## 2025-02-18 NOTE — ASSESSMENT & PLAN NOTE
[facility] On 2/07/24    Dist LAD lesion is 60% stenosed.    3rd Diag lesion is 50% stenosed.    Mid Cx lesion is 40% stenosed.    3rd Mrg lesion is 70% stenosed.    Mid RCA lesion is 40% stenosed.    RPDA lesion is 40% stenosed.istory:   Complaining of midsternal chest tightness with breathlessness with walking/exertion  Continue on aspirin 81 mg daily, Plavix 75 mg daily, Lipitor 40 mg daily.  TTE to be done in the near future  I will discuss symptoms with Dr. Espinosa and Dr. White for further evaluation

## 2025-02-19 ENCOUNTER — TELEPHONE (OUTPATIENT)
Age: 65
End: 2025-02-19

## 2025-02-19 DIAGNOSIS — Z95.5 S/P DRUG ELUTING CORONARY STENT PLACEMENT: Primary | ICD-10-CM

## 2025-02-19 RX ORDER — RANOLAZINE 500 MG/1
500 TABLET, EXTENDED RELEASE ORAL 2 TIMES DAILY
Qty: 60 TABLET | Refills: 3 | Status: SHIPPED | OUTPATIENT
Start: 2025-02-19 | End: 2025-02-19 | Stop reason: SDUPTHER

## 2025-02-19 RX ORDER — RANOLAZINE 500 MG/1
500 TABLET, EXTENDED RELEASE ORAL 2 TIMES DAILY
Qty: 60 TABLET | Refills: 3 | Status: SHIPPED | OUTPATIENT
Start: 2025-02-19 | End: 2025-03-05

## 2025-02-19 NOTE — TELEPHONE ENCOUNTER
If she took Plavix today then stop and start Brilinta tomorrow.  Continue on Brilinta for 2 months and can discuss switching back to Plavix.  Thank you Mary

## 2025-02-19 NOTE — PROGRESS NOTES
Plavix stopped, Briliinta 90mg BID started, Ranexa 500mg BID started.  Follow up in our office in 2 weeks

## 2025-02-19 NOTE — TELEPHONE ENCOUNTER
Pt called in stating that Brilinta is going to cost her around 180$ for a 2 month supply she states that she can try to afford this but she is wondering why the switch from plavix to Brilinta? She asks if she will only need to take the Brilinta for 1 year like she was advised with the Plavix. Also she is wondering if the switch his necessary when should she start the Brilinta?    Please advise

## 2025-02-25 ENCOUNTER — RESULTS FOLLOW-UP (OUTPATIENT)
Dept: CARDIOLOGY CLINIC | Facility: CLINIC | Age: 65
End: 2025-02-25

## 2025-02-25 ENCOUNTER — HOSPITAL ENCOUNTER (OUTPATIENT)
Dept: NON INVASIVE DIAGNOSTICS | Facility: HOSPITAL | Age: 65
Discharge: HOME | End: 2025-02-25
Payer: COMMERCIAL

## 2025-02-25 VITALS
HEART RATE: 62 BPM | WEIGHT: 145 LBS | SYSTOLIC BLOOD PRESSURE: 138 MMHG | DIASTOLIC BLOOD PRESSURE: 76 MMHG | BODY MASS INDEX: 24.75 KG/M2 | HEIGHT: 64 IN

## 2025-02-25 DIAGNOSIS — Z95.5 S/P DRUG ELUTING CORONARY STENT PLACEMENT: ICD-10-CM

## 2025-02-25 LAB
AORTIC ROOT: 3 CM
ASCENDING AORTA: 3.4 CM
BSA FOR ECHO PROCEDURE: 1.7 M2
E WAVE DECELERATION TIME: 209 MS
E/A RATIO: 0.97
FRACTIONAL SHORTENING: 29 (ref 28–44)
INTERVENTRICULAR SEPTUM IN DIASTOLE (PARASTERNAL SHORT AXIS VIEW): 1 CM
INTERVENTRICULAR SEPTUM: 1 CM (ref 0.6–1.1)
LAAS-AP2: 22.2 CM2
LAAS-AP4: 11.4 CM2
LEFT ATRIUM SIZE: 3.6 CM
LEFT ATRIUM VOLUME (MOD BIPLANE): 51 ML
LEFT ATRIUM VOLUME INDEX (MOD BIPLANE): 30 ML/M2
LEFT INTERNAL DIMENSION IN SYSTOLE: 2.4 CM (ref 2.1–4)
LEFT VENTRICULAR INTERNAL DIMENSION IN DIASTOLE: 3.4 CM (ref 3.5–6)
LEFT VENTRICULAR POSTERIOR WALL IN END DIASTOLE: 1 CM
LEFT VENTRICULAR STROKE VOLUME: 28 ML
LV EF US.2D.A4C+ESTIMATED: 59 %
LVSV (TEICH): 28 ML
MV E'TISSUE VEL-LAT: 11 CM/S
MV E'TISSUE VEL-SEP: 10 CM/S
MV PEAK A VEL: 0.75 M/S
MV PEAK E VEL: 73 CM/S
MV STENOSIS PRESSURE HALF TIME: 61 MS
MV VALVE AREA P 1/2 METHOD: 3.61
RA PRESSURE ESTIMATED: 3 MMHG
RIGHT ATRIUM AREA SYSTOLE A4C: 10.2 CM2
RIGHT VENTRICLE ID DIMENSION: 3.3 CM
RV PSP: 22 MMHG
SL CV LEFT ATRIUM LENGTH A2C: 6 CM
SL CV LV EF: 60
SL CV PED ECHO LEFT VENTRICLE DIASTOLIC VOLUME (MOD BIPLANE) 2D: 49 ML
SL CV PED ECHO LEFT VENTRICLE SYSTOLIC VOLUME (MOD BIPLANE) 2D: 21 ML
TR MAX PG: 19 MMHG
TR PEAK VELOCITY: 2.2 M/S
TRICUSPID ANNULAR PLANE SYSTOLIC EXCURSION: 2.5 CM
TRICUSPID VALVE PEAK REGURGITATION VELOCITY: 2.16 M/S

## 2025-02-25 PROCEDURE — 93306 TTE W/DOPPLER COMPLETE: CPT | Performed by: INTERNAL MEDICINE

## 2025-02-25 PROCEDURE — 93306 TTE W/DOPPLER COMPLETE: CPT

## 2025-02-27 ENCOUNTER — CLINICAL SUPPORT (OUTPATIENT)
Dept: CARDIAC REHAB | Age: 65
End: 2025-02-27
Payer: COMMERCIAL

## 2025-02-27 DIAGNOSIS — Z95.5 STATUS POST INSERTION OF DRUG ELUTING CORONARY ARTERY STENT: Primary | ICD-10-CM

## 2025-02-27 PROCEDURE — 93797 PHYS/QHP OP CAR RHAB WO ECG: CPT

## 2025-02-27 NOTE — PROGRESS NOTES
CARDIAC REHABILITATION   ASSESSMENT AND INDIVIDUALIZED TREATMENT PLAN  INITIAL         Today's date: 2025   # of Exercise Sessions Completed: Evaluation - 1  Patient name: Julienne Allan      : 1960  Age: 64 y.o.       MRN: [id number]  Referring Physician: Hermes White MD  Cardiologist: Mariann Espinosa MD   Provider: [place]  Clinician: Karlene Palencia MS, EP         Treatment is tailored to this patient's individual needs.  The ITP was reviewed with the patient and all questions were answered to their satisfaction.  Additional ITP documentation can be found electronically including daily and monthly exercise summaries, daily session notes with ECG summaries, education notes, daily medication reconciliation, and daily physician supervision.      INITIAL EVALUATION SUMMARY 2025    Patient's subjective report of progress/symptoms: patient experienced no symptoms at all, went in for a routine follow up stress test, was sent home right away and was called later that evening that they had found something and that they wanted to do a cardiac cath   Home exercise/ADLs: patient has returned to the gym very lightly walking on the treadmill (is afraid to push herself now), patient was walking 4 miles daily within an hour with her , also focused on HIIT workouts and yoga daily     Initial Fitness Assessment: Submaximal TM ETT:  Resting:  BP: 118/70  HR: 69  Exercise:  BP: 154/74  HR: 102-124  METs:  7.5        Dx:   Encounter Diagnosis   Name Primary?    Status post insertion of drug eluting coronary artery stent        Description of Diagnosis: stent placement w/ PTCA LAD  Date of onset: 25  Other Cardiac History: N/A        ASSESSMENT    Medical History:   Past Medical History:   Diagnosis Date    Allergic 1984    airborne mold and sulfadrugs    Arthritis 2015    in the hands    Asthma 1985    Dengue fever 2000    HL (hearing loss) 2001    Hear my heart beat of chirping that interrupts hearing     Malaria 1991    Miscarriage     Otitis media 2012    once    Shingles 2004    Tonsil stone     2023    Visual impairment 2019 - flashing lights    astigmatism and short sighted since childhood, flashing ligh       Family History:  Family History   Problem Relation Age of Onset    Coronary artery disease Mother     Hypertension Mother     Heart disease Mother         by-pass surgery    Asthma Mother     Coronary artery disease Father     Dementia Father     Heart disease Father         by-pass surgery    Hearing loss Father     Thyroid cancer Sister 50    Skin cancer Sister     No Known Problems Sister     Thyroid disease Sister         removed for cancer    Cancer Sister     Arthritis Maternal Grandmother     Vision loss Maternal Grandmother         macular degeneration    Coronary artery disease Maternal Grandfather     Heart disease Maternal Grandfather     No Known Problems Paternal Grandmother     Coronary artery disease Paternal Grandfather     Heart disease Paternal Grandfather     No Known Problems Maternal Aunt     No Known Problems Paternal Aunt     Heart disease Paternal Aunt     Breast cancer Neg Hx        Allergies:   Contrast dye [iodinated contrast media], Molds & smuts, Tetanus-diphth-acell pertussis, Typhoid vaccines, Microplegia msa-msg [plegisol], and Sulfa antibiotics    Current Medications:   Current Outpatient Medications   Medication Sig Dispense Refill    albuterol (PROVENTIL HFA,VENTOLIN HFA) 90 mcg/act inhaler Inhale 2 puffs every 6 (six) hours as needed for wheezing      aspirin 81 mg chewable tablet Chew 1 tablet (81 mg total) daily      atorvastatin (LIPITOR) 40 mg tablet Take 1 tablet (40 mg total) by mouth daily 100 tablet 3    nitroglycerin (NITROSTAT) 0.4 mg SL tablet Place 1 tablet (0.4 mg total) under the tongue every 5 (five) minutes as needed for chest pain 20 tablet 3    ranolazine (RANEXA) 500 mg 12 hr tablet Take 1 tablet (500 mg total) by mouth 2 (two) times a day 60 tablet  "3    ticagrelor (Brilinta) 90 MG Take 1 tablet (90 mg total) by mouth every 12 (twelve) hours 120 tablet 3    VITAMIN D PO Take 25 mg by mouth in the morning       No current facility-administered medications for this visit.       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 [place]   ADL’s:Capable of performing light to moderate ADLs able to perform self-care resumed driving  [place]: resumed all ADLs able to perform self-care resumed driving  Home exercise: Type: walking, Frequency: 5 days/week, Distance 4  Other Comments: not doing much at this time until she starts rehab       SMART Exercise Goals:   10% improvement in functional capacity based on max METs achieved in initial fitness assessment  reduced dyspnea with physical activity    improved DASI score by 10%  increased exercise capacity by 40% based on peak METs tolerated in cardiac rehab exercise session  maintain > 150 minutes per week of moderate intensity exercise    Patient Specific EXERCISE GOALS:       Get back to exercise on her own   Feeling more confident and less anxious to exercise at home   Stronger,\    Functional Capacity Screening Tool:  [facility] Activity Status Index:  6.27 METs    NUTRITION ASSESSMENT:    Initial Weight:  143  Current Weight:     Height:   Ht Readings from Last 1 Encounters:   02/25/25 5' 3.5\" (1.613 m)       Rate Your Plate Score: 71/81    Diabetes: N/A  A1c:     last measured:     Lipid management: Last lipid profile 1/14/25  Chol 118    HDL 60  LDL 35    Current Dietary " Habits:  Low sodium, low fat, lean grass fed meat, mostly vegetarian at this point in her life, does not eat any sweets      SMART Nutrition Goals:   eat 6 or more servings of grain products per day, eat 2 or more servings of low fat milk or yogurt a day, rarely eat cheese or choose reduced fat or skim, Do not cook with oil, butter or margarine, and choose healthy snacks such as fruit, pretzels, and low fat crackers    Patient Specific NUTRITION GOALS:     1. Patient already follows vegetarian diet and has not been adding sodium or eating any trans fats or saturated fats    2. Stated her  is really strict with diet and she is glad that he is so she does not give in and cave   3. Discussed drinking enough water and getting enough protein being vegetarian (does eat eggs and fish occ)    Drug/Alcohol Use:   No      PSYCHOSOCIAL ASSESSMENT:    Date of last Assessment:  2/27/25  Depression screening:  PHQ-9 = 1    Interpretation:  1-4 = Minimal Depression  Anxiety screening:  MANDI-7 = 10    Interpretation: 10-14 = Moderate anxiety    Pt self-report of depression and anxiety   Patient reports they are coping well with good social support and denies depression or anxiety  Patient reports feelings of anxiety    Self-reported stress level:  6   Stressors:  nothing usually stressors her out but after her stents she is more anxious about everything and doing things at home   Stress Management Tools: practice Relaxation Techniques, exercise, enjoy a hobby, and spend time with family    SMART Psychosocial Goals:     Feelings in [place] Score < 3, Physical Fitness in [facility] Score < 3, Daily Activity in [facility] Score < 3, Quality of Life in [facility] Score < 3 , and Change in Health in [facility] Score < 3     Patient Specific PSYCHOCOSOCIAL GOALS:    Enc to try doing something at home when she is not exercising to occupy her mind   Pt does enjoy gardening and cannot wait for the warm weather to do so   Continue to work  as it 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       OTHER CORE COMPONENT ASSESSMENT:    Tobacco Use:     N/A:  Patient is a non-smoker     Anginal Symptoms:  None   NTG use: Compliant with carrying NTG, Understands proper use, Reviewed Proper use, and Pt has not used NTG since event    SMART Goals:   consistent, controlled resting BP < 130/80, medication compliance, and reduced angina    Patient Specific CORE COMPONENT GOALS:    Reduce chest pain at the gym (educ on warm up and cool down)  Take BP at home if feeling symptomatic         INDIVIDUALIZED TREATMENT PLAN      EXERCISE GOALS and PLAN      Progress toward Exercise goals:   Reviewed Pt goals and determined plan of care    Exercise Plan:    education on home exercise guidelines, home exercise 30+ mins 2 days opposite CR, and Group class: Risk Factors for Heart Disease    The patient was counseled on exercise guidelines to achieve a minimum of 150 mins/wk of moderate intensity (RPE 4-6)   exercise and encouraged to add 1-2 days of exercise on opposite days of cardiac rehab as tolerated.       PHYSICIAN PRESCRIBED EXERCISE:    Current Aerobic Exercise Prescription:      Frequency: 3 days/week   Supplement with home exercise 2+ days/wk as tolerated       Minutes: 20 - 40         METS: 4.0-5.0            HR: Intensity based on RPE 4-6    RPE: 4-6         Modalities: Treadmill, UBE, Lifecycle, and NuStep     Exercise workloads will be progressed gradually as tolerated, within limits of patient's ability, and according to the patient's   response to the  exercise program.      Aerobic Exercise Prescription Plan for Progression   Frequency: 3 days/week of cardiac rehab       Supplement with home exercise 2+ days/wk as tolerated    Minutes: 40       >150 mins/wk of moderate intensity exercise   METS: 5.0-6.0   HR: Intensity based on RPE 4-6      RPE: 4-6   Modalities: Treadmill, Airdyne bike, Lifecycle, Elliptical, and Rower    Strength training:  Will be added following 2-3 weeks of monitored exercise sessions   Modalities: Leg Press, Chest Press, Pull Downs, Seated Row, and Overhead Press    Home Exercise: Type: walking, HIIT, yoga, Frequency: 5 days/week, Duration: 60 mins, Distance 4.0 miles    Exercise Education: benefit of exercise for CAD risk factors, AHA guidelines to achieve >150 mins/wk of moderate exercise, RPE scale, and physical activity/exercise in extreme weather conditions     Readiness to change: Action:  (Changing behavior)      NUTRITION GOALS AND PLAN      Nutritional   Reviewed patient's Rate your Plate. Discussed key elements of heart healthy eating. Reviewed patient goals for dietary modifications and their clinical implications.  Reviewed most recent lipid profile.     Patient's progress toward Nutrition goals:    Reviewed Pt goals and determined plan of care      Nutrition Plan:   group class: Reading Food Labels, group Class: Heart Healthy Eating, replace refined flours with whole grains, and limit meat intake to less than 6oz per day    Measurable goals were based Rate Your Plate Dietary Self-Assessment. These are the areas in which the patient could score higher on the assessment.  Goals include recommendations for a heart healthy diet based on American Heart Association.    Nutrition Education:   heart healthy eating principles  maintaining hydration  healthy choices while dining out  group class: Heart Healthy Eating  group class:  Label Reading    Readiness to change: Action:  (Changing behavior)      PSYCHOSOCIAL GOALS AND  PLAN    Psychosocial Assessment as it relates to rehabilitation:   Patient denies issues with his/her family or home life that may affect their rehabilitation efforts.     Patient's progress toward Psychosocial goals:    Reviewed Pt goals and determined plan of care    Psychosocial Intervention/plan:   Practice relaxation techniques, Exercise, Spend time outdoors, and gardening     Psychosocial Education: benefits of a positive support system and stress management techniques    Information to utilize [employer] was provided as well as contact information for counseling through  Behavioral Health and group psychotherapy groups available.    Readiness to change: Preparation:  (Getting ready to change)       OTHER CORE COMPONENTS GOALS and PLAN      Blood Pressure will be monitored throughout the program and cardiologist will be notified of elevated trends.    Pt will be encouraged to monitor home BP if advised by cardiologist.    Tobacco Plan:   N/A:  Pt is a non-smoker    Progress toward Core Component goals:   Reviewed Pt goals and determined plan of care    Other Core Components Intervention:   group class: Understanding Heart Disease, group class: Common Heart Medications, avoid processed foods, engage in regular exercise, check labels for sodium content, and monitor home BP    Group and Individual Education:  proper use of sublingual NTG, group class: Understanding Heart Disease, and group class:  Common Heart Medications    Readiness to change: Preparation:  (Getting ready to change)

## 2025-03-02 NOTE — PROGRESS NOTES
Advanced Heart Failure / Pulmonary Hypertension Outpatient Visit    Julienne Allan 64 y.o. female   MRN: [id number]  Encounter: [id number]    Assessment:  Patient Active Problem List    Diagnosis Date Noted    Coronary artery disease involving native coronary artery 02/07/2025    Status post insertion of drug eluting coronary artery stent 02/07/2025    Stress incontinence of urine 02/03/2025    COVID-19 virus infection 12/11/2023    Hyperkalemia 10/11/2021    Vitamin D deficiency 10/11/2021    Adverse reaction to diphtheria and tetanus vaccine 10/06/2019    Visual disturbance 09/30/2019    Chronic right-sided low back pain without sciatica 09/30/2019    Family history of early CAD 11/08/2018    Mixed hyperlipidemia 09/28/2018    Welcome to Medicare preventive visit 09/28/2018       Today's Plan:  Continue taking all medications  Reduce Ranexa if headaches continue--take Tylenol for HA  Reduce walking distance if chest tightness occurs.    Continue Cardiac rehab  RTO April 10th with Dr. Espinosa.  Will need to establish w/General Cardiologist    Plan:  Coronary artery disease of native artery of native heart with stable angina pectoris (HCC)  Complaining of midsternal chest tightness with breathlessness with walking/exertion  TTE--LVEF 60%, normal systolic and diastolic function  PRUDENCIO to prox LAD 70% stenosis. PRUDENCIO to mid LAD 85% stenosis  RX:ASA, Brilinta  BP today: 124/60, HR 67      C On 2/07/24--   Dist LAD lesion is 60% stenosed.    3rd Diag lesion is 50% stenosed.    Mid Cx lesion is 40% stenosed.    3rd Mrg lesion is 70% stenosed.    Mid RCA lesion is 40% stenosed.    RPDA lesion is 40% stenosed.    Mixed hyperlipidemia  Lipitor 40 mg QD    HPI  On 12/31/24 Julienne underwent a nuclear stress test which showed small mild reversible defect of the anterior wall which may represent small area of ischemia.  She was scheduled to undergo [facility]     Mary  1 week f/u 2/18: Julienne presents her office for recent hospitalization  follow-up visit.  She is accompanied by her .  Prior to her cardiac catheterization she was exercising with HIT intervals.  Julienne has been walking with symptoms of mid sternal chest tightness associated with breathlessness.  She admits to occasional stabbing left sided chest pains.      3/5/25: 2 week f/u: Patient feeling well overall with return of energy and staying active.  States she has chest tightness when walking 4 miles outside.  This does not occur when walking on treadmill or at cardiac rehab.  Denies CP, SOB, palpitations, dizziness, orthopnea.  Reports getting HA with Ranexa.  Instructed to reduce dose and take Tylenol.  She is traveling to [place] next week.  Discussed taking all medications and wearing compression stockings in flight.         Past Medical History:   Diagnosis Date    Allergic 1984    airborne mold and sulfadrugs    Arthritis 2015    in the hands    Asthma 1985    Dengue fever 2000    HL (hearing loss) 2001    Hear my heart beat of chirping that interrupts hearing    Malaria 1991    Miscarriage     Otitis media 2012    once    Shingles 2004    Tonsil stone     2023    Visual impairment 2019 - flashing lights    astigmatism and short sighted since childhood, flashing ligh       Review of Systems   Constitutional: Negative.    HENT: Negative.     Eyes: Negative.    Respiratory:  Positive for chest tightness. Negative for shortness of breath.         When walking long distances    Chest twinges resolved.     Cardiovascular:  Negative for chest pain, palpitations and leg swelling.   Gastrointestinal: Negative.    Endocrine: Negative.    Genitourinary: Negative.    Musculoskeletal: Negative.    Skin: Negative.    Allergic/Immunologic: Negative.    Neurological: Negative.    Hematological: Negative.    Psychiatric/Behavioral: Negative.         Allergies   Allergen Reactions    Contrast Dye [Iodinated Contrast Media] Anaphylaxis     Per patient, upon contrast administration she felt she  had an asthma attack and stopped breathing. After that it gets fuzzy for the patient in recall of treatment. ALICIA RN 1/24/285    Molds & Smuts Shortness Of Breath    Tetanus-Diphth-Acell Pertussis      Itching, redness, swelling, pain at injection site    Typhoid Vaccines Dermatitis    Microplegia Msa-Msg [Plegisol] Headache    Sulfa Antibiotics Vomiting, Fever and Wheezing         Current Outpatient Medications:     albuterol (PROVENTIL HFA,VENTOLIN HFA) 90 mcg/act inhaler, Inhale 2 puffs every 6 (six) hours as needed for wheezing, Disp: , Rfl:     aspirin 81 mg chewable tablet, Chew 1 tablet (81 mg total) daily, Disp: , Rfl:     atorvastatin (LIPITOR) 40 mg tablet, Take 1 tablet (40 mg total) by mouth daily, Disp: 100 tablet, Rfl: 3    nitroglycerin (NITROSTAT) 0.4 mg SL tablet, Place 1 tablet (0.4 mg total) under the tongue every 5 (five) minutes as needed for chest pain, Disp: 20 tablet, Rfl: 3    ranolazine (RANEXA) 500 mg 12 hr tablet, Take 1 tablet (500 mg total) by mouth 2 (two) times a day, Disp: 60 tablet, Rfl: 3    ticagrelor (Brilinta) 90 MG, Take 1 tablet (90 mg total) by mouth every 12 (twelve) hours, Disp: 120 tablet, Rfl: 3    VITAMIN D PO, Take 25 mg by mouth in the morning, Disp: , Rfl:     Social History     Socioeconomic History    Marital status: /Civil Union     Spouse name: Not on file    Number of children: Not on file    Years of education: Not on file    Highest education level: Not on file   Occupational History    Not on file   Tobacco Use    Smoking status: Never    Smokeless tobacco: Never   Vaping Use    Vaping status: Never Used   Substance and Sexual Activity    Alcohol use: Not Currently     Comment: very occassional    Drug use: Never    Sexual activity: Yes     Partners: Male     Birth control/protection: Post-menopausal     Comment: took pills as a young woman   Other Topics Concern    Not on file   Social History Narrative     no children    Works as Bible  , travels alot     Social Drivers of Health     Financial Resource Strain: Not on file   Food Insecurity: No Food Insecurity (1/29/2025)    Hunger Vital Sign     Worried About Running Out of Food in the Last Year: Never true     Ran Out of Food in the Last Year: Never true   Transportation Needs: No Transportation Needs (1/29/2025)    PRAPARE - Transportation     Lack of Transportation (Medical): No     Lack of Transportation (Non-Medical): No   Physical Activity: Not on file   Stress: Not on file   Social Connections: Not on file   Intimate Partner Violence: Not on file   Housing Stability: Low Risk  (1/29/2025)    Housing Stability Vital Sign     Unable to Pay for Housing in the Last Year: No     Number of Times Moved in the Last Year: 0     Homeless in the Last Year: No     Family History   Problem Relation Age of Onset    Coronary artery disease Mother     Hypertension Mother     Heart disease Mother         by-pass surgery    Asthma Mother     Coronary artery disease Father     Dementia Father     Heart disease Father         by-pass surgery    Hearing loss Father     Thyroid cancer Sister 50    Skin cancer Sister     No Known Problems Sister     Thyroid disease Sister         removed for cancer    Cancer Sister     Arthritis Maternal Grandmother     Vision loss Maternal Grandmother         macular degeneration    Coronary artery disease Maternal Grandfather     Heart disease Maternal Grandfather     No Known Problems Paternal Grandmother     Coronary artery disease Paternal Grandfather     Heart disease Paternal Grandfather     No Known Problems Maternal Aunt     No Known Problems Paternal Aunt     Heart disease Paternal Aunt     Breast cancer Neg Hx        Vitals:   Blood pressure 124/80, pulse 67, weight 64.4 kg (142 lb), SpO2 99%, not currently breastfeeding.    Wt Readings from Last 10 Encounters:   03/05/25 64.4 kg (142 lb)   02/25/25 65.8 kg (145 lb)   02/18/25 65.9 kg (145 lb 4.8 oz)  "  02/07/25 65.8 kg (145 lb)   02/03/25 66 kg (145 lb 9.6 oz)   12/31/24 68 kg (150 lb)   12/26/24 68.1 kg (150 lb 1.6 oz)   10/25/24 65.1 kg (143 lb 9.6 oz)   03/27/24 65.8 kg (145 lb)   01/08/24 65.8 kg (145 lb)     Vitals:    03/05/25 0959   BP: 124/80   BP Location: Left arm   Patient Position: Sitting   Cuff Size: Standard   Pulse: 67   SpO2: 99%   Weight: 64.4 kg (142 lb)       Physical Exam  Cardiovascular:      Rate and Rhythm: Normal rate and regular rhythm.      Heart sounds: Normal heart sounds, S1 normal and S2 normal.   Pulmonary:      Effort: No respiratory distress.      Breath sounds: Normal air entry. Decreased breath sounds present.   Musculoskeletal:      Right lower leg: No edema.      Left lower leg: No edema.   Skin:     General: Skin is warm and dry.   Neurological:      Mental Status: She is alert and oriented to person, place, and time. Mental status is at baseline.   Psychiatric:         Behavior: Behavior is cooperative.         Cognition and Memory: Cognition normal.       Labs & Results:  Lab Results   Component Value Date    WBC 18.10 (H) 02/08/2025    HGB 12.0 02/08/2025    HCT 37.5 02/08/2025    MCV 94 02/08/2025     02/08/2025     Lab Results   Component Value Date    SODIUM 139 02/08/2025    K 5.0 02/08/2025     02/08/2025    CO2 26 02/08/2025    BUN 13 02/08/2025    CREATININE 0.80 02/08/2025    GLUC 85 10/09/2018    CALCIUM 9.7 02/08/2025     No results found for: \"INR\", \"PROTIME\"  No results found for: \"BNP\"    KRISTEL Pinon  "

## 2025-03-03 ENCOUNTER — CLINICAL SUPPORT (OUTPATIENT)
Dept: CARDIAC REHAB | Age: 65
End: 2025-03-03
Payer: COMMERCIAL

## 2025-03-03 DIAGNOSIS — Z95.5 STATUS POST INSERTION OF DRUG ELUTING CORONARY ARTERY STENT: Primary | ICD-10-CM

## 2025-03-03 PROCEDURE — 93798 PHYS/QHP OP CAR RHAB W/ECG: CPT

## 2025-03-05 ENCOUNTER — OFFICE VISIT (OUTPATIENT)
Dept: CARDIOLOGY CLINIC | Facility: CLINIC | Age: 65
End: 2025-03-05
Payer: COMMERCIAL

## 2025-03-05 ENCOUNTER — CLINICAL SUPPORT (OUTPATIENT)
Dept: CARDIAC REHAB | Age: 65
End: 2025-03-05
Payer: COMMERCIAL

## 2025-03-05 VITALS
WEIGHT: 142 LBS | OXYGEN SATURATION: 99 % | DIASTOLIC BLOOD PRESSURE: 80 MMHG | HEART RATE: 67 BPM | SYSTOLIC BLOOD PRESSURE: 124 MMHG | BODY MASS INDEX: 24.76 KG/M2

## 2025-03-05 DIAGNOSIS — Z95.5 S/P DRUG ELUTING CORONARY STENT PLACEMENT: ICD-10-CM

## 2025-03-05 DIAGNOSIS — I25.118 CORONARY ARTERY DISEASE OF NATIVE ARTERY OF NATIVE HEART WITH STABLE ANGINA PECTORIS (HCC): Primary | ICD-10-CM

## 2025-03-05 DIAGNOSIS — Z95.5 STATUS POST INSERTION OF DRUG ELUTING CORONARY ARTERY STENT: Primary | ICD-10-CM

## 2025-03-05 PROBLEM — Z00.00 WELCOME TO MEDICARE PREVENTIVE VISIT: Status: RESOLVED | Noted: 2018-09-28 | Resolved: 2025-03-05

## 2025-03-05 PROCEDURE — 99214 OFFICE O/P EST MOD 30 MIN: CPT

## 2025-03-05 PROCEDURE — 93798 PHYS/QHP OP CAR RHAB W/ECG: CPT

## 2025-03-05 RX ORDER — RANOLAZINE 500 MG/1
500 TABLET, EXTENDED RELEASE ORAL 2 TIMES DAILY
Qty: 60 TABLET | Refills: 3 | Status: SHIPPED | OUTPATIENT
Start: 2025-03-05

## 2025-03-05 NOTE — PATIENT INSTRUCTIONS
Reduce walking if chest tightness occurs    Reduce Ranexa to 1/2 pill if headaches continue--Take Tylenol for headaches    Compression stockings for plane flight. Increase fluid intake on day of flight

## 2025-03-06 ENCOUNTER — TELEPHONE (OUTPATIENT)
Dept: CARDIOLOGY CLINIC | Facility: CLINIC | Age: 65
End: 2025-03-06

## 2025-03-06 NOTE — TELEPHONE ENCOUNTER
Patient called the RX Refill Line. Message is being forwarded to the office.     Patient is requesting a follow up regarding her ranolazine (RANEXA) 500 mg 12 hr tablet. In her instructions from her office visit yesterday it states:  Return in about 1 month (around 4/5/2025).  Reduce Ranexa to 1/2 pill if headaches continue--Take Tylenol for headaches    The patient is unsure if she should do this because her bottle of Ranexa states do not cut in half because it is a time release tablet. She would like to clarify this with the doctor. Please review.      Please contact patient at

## 2025-03-07 ENCOUNTER — CLINICAL SUPPORT (OUTPATIENT)
Dept: CARDIAC REHAB | Age: 65
End: 2025-03-07
Payer: COMMERCIAL

## 2025-03-07 DIAGNOSIS — Z95.5 STATUS POST INSERTION OF DRUG ELUTING CORONARY ARTERY STENT: Primary | ICD-10-CM

## 2025-03-07 PROCEDURE — 93798 PHYS/QHP OP CAR RHAB W/ECG: CPT

## 2025-03-10 ENCOUNTER — CLINICAL SUPPORT (OUTPATIENT)
Dept: CARDIAC REHAB | Age: 65
End: 2025-03-10
Payer: COMMERCIAL

## 2025-03-10 DIAGNOSIS — Z95.5 STATUS POST INSERTION OF DRUG ELUTING CORONARY ARTERY STENT: Primary | ICD-10-CM

## 2025-03-10 PROCEDURE — 93798 PHYS/QHP OP CAR RHAB W/ECG: CPT

## 2025-03-12 ENCOUNTER — CLINICAL SUPPORT (OUTPATIENT)
Dept: CARDIAC REHAB | Age: 65
End: 2025-03-12
Payer: COMMERCIAL

## 2025-03-12 DIAGNOSIS — Z95.5 STATUS POST INSERTION OF DRUG ELUTING CORONARY ARTERY STENT: Primary | ICD-10-CM

## 2025-03-12 PROCEDURE — 93798 PHYS/QHP OP CAR RHAB W/ECG: CPT

## 2025-03-14 ENCOUNTER — APPOINTMENT (OUTPATIENT)
Dept: CARDIAC REHAB | Age: 65
End: 2025-03-14
Payer: COMMERCIAL

## 2025-03-17 ENCOUNTER — APPOINTMENT (OUTPATIENT)
Dept: CARDIAC REHAB | Age: 65
End: 2025-03-17
Payer: COMMERCIAL

## 2025-03-19 ENCOUNTER — APPOINTMENT (OUTPATIENT)
Dept: CARDIAC REHAB | Age: 65
End: 2025-03-19
Payer: COMMERCIAL

## 2025-03-21 ENCOUNTER — APPOINTMENT (OUTPATIENT)
Dept: CARDIAC REHAB | Age: 65
End: 2025-03-21
Payer: COMMERCIAL

## 2025-03-24 ENCOUNTER — APPOINTMENT (OUTPATIENT)
Dept: CARDIAC REHAB | Age: 65
End: 2025-03-24
Payer: COMMERCIAL

## 2025-03-26 ENCOUNTER — APPOINTMENT (OUTPATIENT)
Dept: CARDIAC REHAB | Age: 65
End: 2025-03-26
Payer: COMMERCIAL

## 2025-03-28 ENCOUNTER — APPOINTMENT (OUTPATIENT)
Dept: CARDIAC REHAB | Age: 65
End: 2025-03-28
Payer: COMMERCIAL

## 2025-03-31 ENCOUNTER — APPOINTMENT (OUTPATIENT)
Dept: CARDIAC REHAB | Age: 65
End: 2025-03-31
Payer: COMMERCIAL

## 2025-04-02 ENCOUNTER — APPOINTMENT (OUTPATIENT)
Dept: CARDIAC REHAB | Age: 65
End: 2025-04-02
Payer: COMMERCIAL

## 2025-04-03 ENCOUNTER — TELEPHONE (OUTPATIENT)
Dept: CARDIOLOGY CLINIC | Facility: CLINIC | Age: 65
End: 2025-04-03

## 2025-04-03 NOTE — TELEPHONE ENCOUNTER
Left message for patient to call back to reschedule 4/10/25 appointment with Dr Espinosa due to change in provider's schedule.

## 2025-04-04 ENCOUNTER — APPOINTMENT (OUTPATIENT)
Dept: CARDIAC REHAB | Age: 65
End: 2025-04-04
Payer: COMMERCIAL

## 2025-04-04 ENCOUNTER — NURSE TRIAGE (OUTPATIENT)
Dept: OTHER | Facility: OTHER | Age: 65
End: 2025-04-04

## 2025-04-04 NOTE — TELEPHONE ENCOUNTER
"Reason for Disposition  • Question about upcoming scheduled surgery, procedure or test, no triage required, and triager able to answer question    Answer Assessment - Initial Assessment Questions  1. REASON FOR CALL: \"What is the main reason for your call?\" or \"How can I best help you?\"      Patient inquiring why here appointment was cancelled.    Protocols used: Information Only Call - No Triage-Adult-    "

## 2025-04-07 ENCOUNTER — CLINICAL SUPPORT (OUTPATIENT)
Dept: CARDIAC REHAB | Age: 65
End: 2025-04-07
Payer: COMMERCIAL

## 2025-04-07 DIAGNOSIS — Z95.5 STATUS POST INSERTION OF DRUG ELUTING CORONARY ARTERY STENT: Primary | ICD-10-CM

## 2025-04-07 PROCEDURE — 93798 PHYS/QHP OP CAR RHAB W/ECG: CPT

## 2025-04-09 ENCOUNTER — CLINICAL SUPPORT (OUTPATIENT)
Dept: CARDIAC REHAB | Age: 65
End: 2025-04-09
Payer: COMMERCIAL

## 2025-04-09 ENCOUNTER — OFFICE VISIT (OUTPATIENT)
Dept: CARDIOLOGY CLINIC | Facility: CLINIC | Age: 65
End: 2025-04-09
Payer: COMMERCIAL

## 2025-04-09 ENCOUNTER — NURSE TRIAGE (OUTPATIENT)
Age: 65
End: 2025-04-09

## 2025-04-09 VITALS
WEIGHT: 142.7 LBS | OXYGEN SATURATION: 100 % | BODY MASS INDEX: 24.36 KG/M2 | HEART RATE: 60 BPM | SYSTOLIC BLOOD PRESSURE: 122 MMHG | DIASTOLIC BLOOD PRESSURE: 76 MMHG | HEIGHT: 64 IN

## 2025-04-09 DIAGNOSIS — I25.118 CORONARY ARTERY DISEASE OF NATIVE ARTERY OF NATIVE HEART WITH STABLE ANGINA PECTORIS (HCC): ICD-10-CM

## 2025-04-09 DIAGNOSIS — Z95.5 STATUS POST INSERTION OF DRUG ELUTING CORONARY ARTERY STENT: Primary | ICD-10-CM

## 2025-04-09 DIAGNOSIS — E78.2 MIXED HYPERLIPIDEMIA: Primary | ICD-10-CM

## 2025-04-09 PROCEDURE — 99214 OFFICE O/P EST MOD 30 MIN: CPT | Performed by: INTERNAL MEDICINE

## 2025-04-09 PROCEDURE — 93798 PHYS/QHP OP CAR RHAB W/ECG: CPT

## 2025-04-09 RX ORDER — CLOPIDOGREL BISULFATE 75 MG/1
75 TABLET ORAL DAILY
Qty: 100 TABLET | Refills: 3 | Status: SHIPPED | OUTPATIENT
Start: 2025-04-09

## 2025-04-09 NOTE — PATIENT INSTRUCTIONS
Recommendations:  Discontinue Ranexa.   Will switch from ticagrelor to clopidogrel in 90 days.  Continue remainder of medications.   Check fasting lipid panel and complete metabolic profile in Summer 2025.  Follow up in 6 months.

## 2025-04-09 NOTE — PROGRESS NOTES
Cardiology   Julienne Allan 64 y.o. female MRN: [id number]        Impression:  CAD s/p PCI of LAD - 2/25 - doing well.  Moderate disease.  Echo demonstrated normal LV systolic function.  Dyslipidemia - on statin.    Recommendations:  Discontinue Ranexa.   Will switch from ticagrelor to clopidogrel in 90 days.  Continue remainder of medications.   Check fasting lipid panel and complete metabolic profile in Summer 2025.  Follow up in 6 months.       HPI: Julienne Allan is a 64 y.o. year old female with CAD s/p PCI of pLAD/mLAD 2/25,echo 2/25 - EF 60% no valvular abnormalities, dyslipidemia, who presents for follow up.  Feels well.  No chest pain, shortness of breath, or palpitations.  No issues with exertion.         Review of Systems   Constitutional: Negative.    HENT: Negative.     Eyes: Negative.    Respiratory:  Negative for chest tightness and shortness of breath.    Cardiovascular:  Negative for chest pain, palpitations and leg swelling.   Gastrointestinal: Negative.    Endocrine: Negative.    Genitourinary: Negative.    Musculoskeletal: Negative.    Skin: Negative.    Allergic/Immunologic: Negative.    Neurological: Negative.    Hematological: Negative.    Psychiatric/Behavioral: Negative.     All other systems reviewed and are negative.        Past Medical History:   Diagnosis Date    Allergic 1984    airborne mold and sulfadrugs    Arthritis 2015    in the hands    Asthma 1985    Dengue fever 2000    HL (hearing loss) 2001    Hear my heart beat of chirping that interrupts hearing    Malaria 1991    Miscarriage     Otitis media 2012    once    Shingles 2004    Tonsil stone     2023    Visual impairment 2019 - flashing lights    astigmatism and short sighted since childhood, flashing ligh     Past Surgical History:   Procedure Laterality Date    CARDIAC CATHETERIZATION Left 2/7/2025    Procedure: Cardiac Left Heart Cath;  Surgeon: Hermes White MD;  Location: [facility];  Service: Cardiology    CARDIAC  CATHETERIZATION N/A 2/7/2025    Procedure: Cardiac PCI;  Surgeon: Hermes White MD;  Location: [facility];  Service: Cardiology    DILATION AND CURETTAGE OF UTERUS      x2    NO PAST SURGERIES       Social History     Substance and Sexual Activity   Alcohol Use Not Currently    Comment: very occassional     Social History     Substance and Sexual Activity   Drug Use Never     Social History     Tobacco Use   Smoking Status Never   Smokeless Tobacco Never     Family History   Problem Relation Age of Onset    Coronary artery disease Mother     Hypertension Mother     Heart disease Mother         by-pass surgery    Asthma Mother     Coronary artery disease Father     Dementia Father     Heart disease Father         by-pass surgery    Hearing loss Father     Thyroid cancer Sister 50    Skin cancer Sister     No Known Problems Sister     Thyroid disease Sister         removed for cancer    Cancer Sister     Arthritis Maternal Grandmother     Vision loss Maternal Grandmother         macular degeneration    Coronary artery disease Maternal Grandfather     Heart disease Maternal Grandfather     No Known Problems Paternal Grandmother     Coronary artery disease Paternal Grandfather     Heart disease Paternal Grandfather     No Known Problems Maternal Aunt     No Known Problems Paternal Aunt     Heart disease Paternal Aunt     Breast cancer Neg Hx        Allergies:  Allergies   Allergen Reactions    Contrast Dye [Iodinated Contrast Media] Anaphylaxis     Per patient, upon contrast administration she felt she had an asthma attack and stopped breathing. After that it gets fuzzy for the patient in recall of treatment. ALICIA RN 1/24/285    Molds & Smuts Shortness Of Breath    Tetanus-Diphth-Acell Pertussis      Itching, redness, swelling, pain at injection site    Typhoid Vaccines Dermatitis    Microplegia Msa-Msg [Plegisol] Headache    Sulfa Antibiotics Vomiting, Fever and Wheezing       Medications:     Current  Outpatient Medications:     albuterol (PROVENTIL HFA,VENTOLIN HFA) 90 mcg/act inhaler, Inhale 2 puffs every 6 (six) hours as needed for wheezing, Disp: , Rfl:     aspirin 81 mg chewable tablet, Chew 1 tablet (81 mg total) daily, Disp: , Rfl:     atorvastatin (LIPITOR) 40 mg tablet, Take 1 tablet (40 mg total) by mouth daily, Disp: 100 tablet, Rfl: 3    nitroglycerin (NITROSTAT) 0.4 mg SL tablet, Place 1 tablet (0.4 mg total) under the tongue every 5 (five) minutes as needed for chest pain, Disp: 20 tablet, Rfl: 3    ranolazine (RANEXA) 500 mg 12 hr tablet, Take 1 tablet (500 mg total) by mouth 2 (two) times a day (Patient taking differently: Take 500 mg by mouth daily), Disp: 60 tablet, Rfl: 3    ticagrelor (Brilinta) 90 MG, Take 1 tablet (90 mg total) by mouth every 12 (twelve) hours, Disp: 120 tablet, Rfl: 3    VITAMIN D PO, Take 25 mg by mouth in the morning, Disp: , Rfl:       Wt Readings from Last 3 Encounters:   04/09/25 64.7 kg (142 lb 11.2 oz)   03/05/25 64.4 kg (142 lb)   02/25/25 65.8 kg (145 lb)     Temp Readings from Last 3 Encounters:   02/07/25 98.1 °F (36.7 °C) (Temporal)   10/25/24 (!) 97.3 °F (36.3 °C) (Probe)   12/11/23 97.6 °F (36.4 °C) (Tympanic)     BP Readings from Last 3 Encounters:   04/09/25 122/76   03/05/25 124/80   02/25/25 138/76     Pulse Readings from Last 3 Encounters:   04/09/25 60   03/05/25 67   02/25/25 62         Physical Exam  HENT:      Head: Atraumatic.      Mouth/Throat:      Mouth: Mucous membranes are moist.   Eyes:      Extraocular Movements: Extraocular movements intact.   Cardiovascular:      Rate and Rhythm: Normal rate and regular rhythm.   Pulmonary:      Effort: Pulmonary effort is normal.      Breath sounds: Normal breath sounds.   Abdominal:      General: Abdomen is flat.   Musculoskeletal:         General: Normal range of motion.      Cervical back: Normal range of motion.   Skin:     General: Skin is warm.   Neurological:      General: No focal deficit present.  "     Mental Status: She is alert and oriented to person, place, and time.   Psychiatric:         Mood and Affect: Mood normal.         Behavior: Behavior normal.           Laboratory Studies:  CMP:  Lab Results   Component Value Date    K 5.0 02/08/2025     02/08/2025    CO2 26 02/08/2025    BUN 13 02/08/2025    CREATININE 0.80 02/08/2025    AST 23 01/14/2025    ALT 24 01/14/2025    EGFR 77 02/08/2025       Lipid Profile:   No results found for: \"CHOL\"  Lab Results   Component Value Date    HDL 60 01/14/2025     Lab Results   Component Value Date    LDLCALC 35 01/14/2025     Lab Results   Component Value Date    TRIG 117 01/14/2025         Results for orders placed during the hospital encounter of 12/31/24    NM myocardial perfusion spect (stress and/or rest)    Interpretation Summary    Stress ECG: Horizontal ST depression of 1.0 mm (V5) is noted. ST depression began at minute 8:50 of stress and ended at minute 0:50 of recovery. ST deviation returned to baseline after less than 1 minute of recovery. There were no arrhythmias during recovery. . The ECG was negative for ischemia. The stress ECG is negative for ischemia after maximal exercise, without reproduction of symptoms.    Perfusion: There is a left ventricular perfusion defect that is small in size with mild reduction in uptake present in the mid anterior location(s) that is reversible.    Stress Combined Conclusion: The ECG and SPECT imaging portions of the stress study are discordant but are nonetheless concerning for inducible myocardial ischemia given the known limited sensitivity of stress electrocardiography. Left ventricular perfusion is probably normal. Findings are consistent with ischemia.    Excellent exercise capacity for age. Small, mild reversible defect on the anterior wall which may represent a small area of ischemia.          "

## 2025-04-09 NOTE — TELEPHONE ENCOUNTER
"Reason for Disposition   Pharmacy calling with prescription question and triager answers question     [facility] calling to confirm dose of Plavix ordered. Reviewed chart, verified dose ordered as loading dose of 300mg then daily 75mg. Relayed OV note as to switch from Brilinta to Plavix in 90 days but no reason given. Pharmacy stated Julienne just picked up a script of Brilinta yesterday so she will use that up then get the Plavix.    Answer Assessment - Initial Assessment Questions  1. NAME of MEDICINE: \"What medicine(s) are you calling about?\"      plavix  2. QUESTION: \"What is your question?\" (e.g., double dose of medicine, side effect)      Verifying new prescription dose of Plavix, loading dose 300mg then 75mg daily  3. PRESCRIBER: \"Who prescribed the medicine?\" Reason: if prescribed by specialist, call should be referred to that group.      Dr Montiel    Protocols used: Medication Question Call-Adult-[place]    "

## 2025-04-11 ENCOUNTER — CLINICAL SUPPORT (OUTPATIENT)
Dept: CARDIAC REHAB | Age: 65
End: 2025-04-11
Payer: COMMERCIAL

## 2025-04-11 DIAGNOSIS — Z95.5 STATUS POST INSERTION OF DRUG ELUTING CORONARY ARTERY STENT: Primary | ICD-10-CM

## 2025-04-11 PROCEDURE — 93798 PHYS/QHP OP CAR RHAB W/ECG: CPT

## 2025-04-14 ENCOUNTER — CLINICAL SUPPORT (OUTPATIENT)
Dept: CARDIAC REHAB | Age: 65
End: 2025-04-14
Payer: COMMERCIAL

## 2025-04-14 DIAGNOSIS — Z95.5 STATUS POST INSERTION OF DRUG ELUTING CORONARY ARTERY STENT: Primary | ICD-10-CM

## 2025-04-14 PROCEDURE — 93798 PHYS/QHP OP CAR RHAB W/ECG: CPT

## 2025-04-16 ENCOUNTER — CLINICAL SUPPORT (OUTPATIENT)
Dept: CARDIAC REHAB | Age: 65
End: 2025-04-16
Payer: COMMERCIAL

## 2025-04-16 DIAGNOSIS — Z95.5 STATUS POST INSERTION OF DRUG ELUTING CORONARY ARTERY STENT: Primary | ICD-10-CM

## 2025-04-16 PROCEDURE — 93798 PHYS/QHP OP CAR RHAB W/ECG: CPT

## 2025-04-18 ENCOUNTER — CLINICAL SUPPORT (OUTPATIENT)
Dept: CARDIAC REHAB | Age: 65
End: 2025-04-18
Payer: COMMERCIAL

## 2025-04-18 DIAGNOSIS — Z95.5 STATUS POST INSERTION OF DRUG ELUTING CORONARY ARTERY STENT: Primary | ICD-10-CM

## 2025-04-18 PROCEDURE — 93798 PHYS/QHP OP CAR RHAB W/ECG: CPT

## 2025-04-21 ENCOUNTER — CLINICAL SUPPORT (OUTPATIENT)
Dept: CARDIAC REHAB | Age: 65
End: 2025-04-21
Payer: COMMERCIAL

## 2025-04-21 DIAGNOSIS — Z95.5 STATUS POST INSERTION OF DRUG ELUTING CORONARY ARTERY STENT: Primary | ICD-10-CM

## 2025-04-21 PROCEDURE — 93798 PHYS/QHP OP CAR RHAB W/ECG: CPT

## 2025-04-23 ENCOUNTER — APPOINTMENT (OUTPATIENT)
Dept: CARDIAC REHAB | Age: 65
End: 2025-04-23
Payer: COMMERCIAL

## 2025-04-24 NOTE — PROGRESS NOTES
CARDIAC REHABILITATION   ASSESSMENT AND INDIVIDUALIZED TREATMENT PLAN  60 DAY        Today's date: 2025   # of Exercise Sessions Completed: 13  Patient name: Julienne Allan      : 1960  Age: 64 y.o.       MRN: [id number]  Referring Physician: Hermes White MD  Cardiologist: Mariann Espinosa MD   Provider: [place]  Clinician: Jannette Rudolph MS, CEP        Treatment is tailored to this patient's individual needs.  The ITP was reviewed with the patient and all questions were answered to their satisfaction.  Additional ITP documentation can be found electronically including daily and monthly exercise summaries, daily session notes with ECG summaries, education notes, daily medication reconciliation, and daily physician supervision.      SUMMARY 2025 Pt not available to assess progression towards goals as she is traveling.     Resting BP  110//72,  HR 59-69  Exercise //74.  -162  Exercise session details:  40 minutes,  4.7-7.8 METs  Telemetry:  NSR, rare PVC  Symptoms: None   Clinical Comments: Pt was progressing very well in her limited amount of sessions she was able to get in before a work trip. She is currently traveling again for work for close to a month.     Dx:   Encounter Diagnosis   Name Primary?    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    Asthma 1985    Dengue fever 2000    HL (hearing loss) 2001    Hear my heart beat of chirping that interrupts hearing    Malaria     Miscarriage     Otitis media 2012    once    Shingles 2004    Tonsil stone         Visual impairment 2019 - flashing lights    astigmatism and short sighted since childhood, flashing ligh        Family History:  Family History   Problem Relation Age of Onset    Coronary artery disease Mother     Hypertension Mother     Heart disease Mother         by-pass surgery    Asthma Mother     Coronary artery disease Father     Dementia Father     Heart disease Father         by-pass surgery    Hearing loss Father     Thyroid cancer Sister 50    Skin cancer Sister     No Known Problems Sister     Thyroid disease Sister         removed for cancer    Cancer Sister     Arthritis Maternal Grandmother     Vision loss Maternal Grandmother         macular degeneration    Coronary artery disease Maternal Grandfather     Heart disease Maternal Grandfather     No Known Problems Paternal Grandmother     Coronary artery disease Paternal Grandfather     Heart disease Paternal Grandfather     No Known Problems Maternal Aunt     No Known Problems Paternal Aunt     Heart disease Paternal Aunt     Breast cancer Neg Hx        Allergies:   Contrast dye [iodinated contrast media], Molds & smuts, Tetanus-diphth-acell pertussis, Typhoid vaccines, Microplegia msa-msg [plegisol], and Sulfa antibiotics    Current Medications:   Current Outpatient Medications   Medication Sig Dispense Refill    albuterol (PROVENTIL HFA,VENTOLIN HFA) 90 mcg/act inhaler Inhale 2 puffs every 6 (six) hours as needed for wheezing      aspirin 81 mg chewable tablet Chew 1 tablet (81 mg total) daily      atorvastatin (LIPITOR) 40 mg tablet Take 1 tablet (40 mg total) by mouth daily 100 tablet 3    clopidogrel (Plavix) 75 mg tablet Take 1 tablet (75 mg total) by mouth daily 4 tablets on first day, then 1 tablet daily. 100 tablet 3    nitroglycerin (NITROSTAT) 0.4 mg SL tablet Place 1 tablet (0.4 mg total) under the tongue every 5 (five) minutes as needed for chest pain 20 tablet 3    VITAMIN D PO Take 25 mg by mouth in the morning       No current facility-administered medications for this visit.       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 [place]   ADL’s:Capable of performing light to moderate ADLs able to perform self-care resumed driving  [place]: resumed all ADLs able to perform self-care resumed driving  Home exercise: Type: walking, Frequency: 5 days/week, Distance 4  Other Comments: not doing much at this time until she starts rehab       SMART Exercise Goals:   10% improvement in functional capacity based on max METs achieved in initial fitness assessment  reduced dyspnea with physical activity    improved DASI score by 10%  increased exercise capacity by 40% based on peak METs tolerated in cardiac rehab exercise session  maintain > 150 minutes per week of moderate intensity exercise    Patient Specific EXERCISE GOALS:       Get back to exercise on her own   Feeling more confident and less anxious to exercise at home   Stronger    Functional Capacity Screening Tool:  [facility] Activity Status Index:  6.27 METs    NUTRITION ASSESSMENT:    Initial Weight:  143  Current Weight: 143    Height:   Ht Readings from Last 1 Encounters:   04/09/25 5' 3.5\" (1.613 m)       Rate Your Plate Score: 71/81    Diabetes: N/A  A1c:     last measured:     Lipid management: Last lipid profile 1/14/25  Chol 118    HDL 60  LDL 35    Current Dietary Habits:  Low sodium, low fat, lean grass fed meat, mostly vegetarian at this point in her life, does not eat any sweets      SMART Nutrition Goals:   eat 6 or more servings of grain products per day, eat 2 or more servings of low fat milk or yogurt a day, rarely eat cheese or choose " reduced fat or skim, Do not cook with oil, butter or margarine, and choose healthy snacks such as fruit, pretzels, and low fat crackers    Patient Specific NUTRITION GOALS:     1. Patient already follows vegetarian diet and has not been adding sodium or eating any trans fats or saturated fats    2. Stated her  is really strict with diet and she is glad that he is so she does not give in and cave   3. Discussed drinking enough water and getting enough protein being vegetarian (does eat eggs and fish occ)    Drug/Alcohol Use:   No      PSYCHOSOCIAL ASSESSMENT:    Date of last Assessment:  2/27/25  Depression screening:  PHQ-9 = 1    Interpretation:  1-4 = Minimal Depression  Anxiety screening:  MANDI-7 = 10    Interpretation: 10-14 = Moderate anxiety    Pt self-report of depression and anxiety   Patient reports they are coping well with good social support and denies depression or anxiety  Patient reports feelings of anxiety    Self-reported stress level:  6   Stressors:  nothing usually stressors her out but after her stents she is more anxious about everything and doing things at home   Stress Management Tools: practice Relaxation Techniques, exercise, enjoy a hobby, and spend time with family    SMART Psychosocial Goals:     Feelings in [place] Score < 3, Physical Fitness in [facility] Score < 3, Daily Activity in [facility] Score < 3, Quality of Life in [facility] Score < 3 , and Change in Health in [facility] Score < 3     Patient Specific PSYCHOCOSOCIAL GOALS:    Enc to try doing something at home when she is not exercising to occupy her mind   Pt does enjoy gardening and cannot wait for the warm weather to do so   Continue to work as it 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       OTHER CORE COMPONENT ASSESSMENT:    Tobacco Use:     N/A:  Patient is a non-smoker     Anginal Symptoms:  None   NTG use: Compliant with carrying NTG, Understands proper use, Reviewed Proper use, and Pt has not used NTG since event    SMART Goals:   consistent, controlled resting BP < 130/80, medication compliance, and reduced angina    Patient Specific CORE COMPONENT GOALS:    Reduce chest pain at the gym (educ on warm up and cool down)  Take BP at home if feeling symptomatic       INDIVIDUALIZED TREATMENT PLAN      EXERCISE GOALS and PLAN      Progress toward Exercise goals:   Pt is progressing and showing improvement  toward the following goals:  improving workloads without struggle in rehab, compliant with 1 hour of home exercise outside of CR.  , Will continue to educate and progress as tolerated.    Exercise Plan:    education on home exercise guidelines, home exercise 30+ mins 2 days opposite CR, and Group class: Risk Factors for Heart Disease    The patient was counseled on exercise guidelines to achieve a minimum of 150 mins/wk of moderate intensity (RPE 4-6)   exercise and encouraged to add 1-2 days of exercise on opposite days of cardiac rehab as tolerated.       PHYSICIAN PRESCRIBED EXERCISE:    Current Aerobic Exercise Prescription:      Frequency: 3 days/week   Supplement with home exercise 2+ days/wk as tolerated       Minutes: 40         METS: 4.7-7.8          HR: Intensity based on RPE 4-6    RPE: 4-6         Modalities: Treadmill, Airdyne bike, UBE, Lifecycle, Elliptical, Rower, and NuStep     Exercise workloads will be progressed gradually as tolerated, within limits of patient's ability, and according to the patient's   response to the exercise program.      Aerobic Exercise Prescription Plan for  Progression   Frequency: 3 days/week of cardiac rehab       Supplement with home exercise 2+ days/wk as tolerated    Minutes: 40       >150 mins/wk of moderate intensity exercise   METS: 3.2-5.0   HR: Intensity based on RPE 4-6      RPE: 4-6   Modalities: Treadmill, Airdyne bike, Lifecycle, Elliptical, and Rower    Strength training:  Will be added following 2-3 weeks of monitored exercise sessions   Modalities: Leg Press, Chest Press, Pull Downs, Seated Row, and Overhead Press    Home Exercise: Type: walking, HIIT, yoga, Frequency: 5 days/week, Duration: 60 mins, Distance 4.0 miles    Exercise Education: benefit of exercise for CAD risk factors, AHA guidelines to achieve >150 mins/wk of moderate exercise, RPE scale, and physical activity/exercise in extreme weather conditions     Readiness to change: Action:  (Changing behavior)      NUTRITION GOALS AND PLAN      Nutritional   Reviewed patient's Rate your Plate. Discussed key elements of heart healthy eating. Reviewed patient goals for dietary modifications and their clinical implications.  Reviewed most recent lipid profile.     Patient's progress toward Nutrition goals:    Unable to discuss progression at this time.       Nutrition Plan:   group class: Reading Food Labels, group Class: Heart Healthy Eating, replace refined flours with whole grains, and limit meat intake to less than 6oz per day    Measurable goals were based Rate Your Plate Dietary Self-Assessment. These are the areas in which the patient could score higher on the assessment.  Goals include recommendations for a heart healthy diet based on American Heart Association.    Nutrition Education:   heart healthy eating principles  maintaining hydration  healthy choices while dining out  group class: Heart Healthy Eating  group class:  Label Reading    Readiness to change: Action:  (Changing behavior)      PSYCHOSOCIAL GOALS AND PLAN    Psychosocial Assessment as it relates to rehabilitation:   Patient  denies issues with his/her family or home life that may affect their rehabilitation efforts.     Patient's progress toward Psychosocial goals:     Unable to assess progression at this time.     Psychosocial Intervention/plan:   Practice relaxation techniques, Exercise, Spend time outdoors, and gardening     Psychosocial Education: benefits of a positive support system and stress management techniques    Information to utilize [employer] was provided as well as contact information for counseling through  Behavioral Health and group psychotherapy groups available.    Readiness to change: Preparation:  (Getting ready to change)       OTHER CORE COMPONENTS GOALS and PLAN      Blood Pressure will be monitored throughout the program and cardiologist will be notified of elevated trends.    Pt will be encouraged to monitor home BP if advised by cardiologist.    Tobacco Plan:   N/A:  Pt is a non-smoker    Progress toward Core Component goals:    Unable to assess progression at this time.     Other Core Components Intervention:   group class: Common Heart Medications, avoid processed foods, engage in regular exercise, check labels for sodium content, and monitor home BP    Group and Individual Education:  proper use of sublingual NTG, group class: Understanding Heart Disease, and group class:  Common Heart Medications    Readiness to change: Preparation:  (Getting ready to change)

## 2025-04-25 ENCOUNTER — APPOINTMENT (OUTPATIENT)
Dept: CARDIAC REHAB | Age: 65
End: 2025-04-25
Payer: COMMERCIAL

## 2025-04-28 ENCOUNTER — APPOINTMENT (OUTPATIENT)
Dept: CARDIAC REHAB | Age: 65
End: 2025-04-28
Payer: COMMERCIAL

## 2025-04-30 ENCOUNTER — APPOINTMENT (OUTPATIENT)
Dept: CARDIAC REHAB | Age: 65
End: 2025-04-30
Payer: COMMERCIAL

## 2025-05-02 ENCOUNTER — APPOINTMENT (OUTPATIENT)
Dept: CARDIAC REHAB | Age: 65
End: 2025-05-02
Payer: COMMERCIAL

## 2025-05-05 ENCOUNTER — APPOINTMENT (OUTPATIENT)
Dept: CARDIAC REHAB | Age: 65
End: 2025-05-05
Payer: COMMERCIAL

## 2025-05-07 ENCOUNTER — APPOINTMENT (OUTPATIENT)
Dept: CARDIAC REHAB | Age: 65
End: 2025-05-07
Payer: COMMERCIAL

## 2025-05-09 ENCOUNTER — APPOINTMENT (OUTPATIENT)
Dept: CARDIAC REHAB | Age: 65
End: 2025-05-09
Payer: COMMERCIAL

## 2025-05-14 ENCOUNTER — APPOINTMENT (OUTPATIENT)
Dept: CARDIAC REHAB | Age: 65
End: 2025-05-14
Payer: COMMERCIAL

## 2025-05-16 ENCOUNTER — CLINICAL SUPPORT (OUTPATIENT)
Dept: CARDIAC REHAB | Age: 65
End: 2025-05-16
Payer: COMMERCIAL

## 2025-05-16 ENCOUNTER — HOSPITAL ENCOUNTER (OUTPATIENT)
Dept: RADIOLOGY | Age: 65
Discharge: HOME | End: 2025-05-16
Payer: COMMERCIAL

## 2025-05-16 VITALS — HEIGHT: 64 IN | BODY MASS INDEX: 24.07 KG/M2 | WEIGHT: 141 LBS

## 2025-05-16 DIAGNOSIS — Z13.820 ENCOUNTER FOR SCREENING FOR OSTEOPOROSIS: ICD-10-CM

## 2025-05-16 DIAGNOSIS — Z78.0 ASYMPTOMATIC MENOPAUSAL STATE: ICD-10-CM

## 2025-05-16 DIAGNOSIS — Z95.5 STATUS POST INSERTION OF DRUG ELUTING CORONARY ARTERY STENT: Primary | ICD-10-CM

## 2025-05-16 PROCEDURE — 77080 DXA BONE DENSITY AXIAL: CPT

## 2025-05-16 PROCEDURE — 93798 PHYS/QHP OP CAR RHAB W/ECG: CPT

## 2025-05-19 ENCOUNTER — CLINICAL SUPPORT (OUTPATIENT)
Dept: CARDIAC REHAB | Age: 65
End: 2025-05-19
Payer: COMMERCIAL

## 2025-05-19 DIAGNOSIS — Z95.5 STATUS POST INSERTION OF DRUG ELUTING CORONARY ARTERY STENT: Primary | ICD-10-CM

## 2025-05-19 PROCEDURE — 93798 PHYS/QHP OP CAR RHAB W/ECG: CPT

## 2025-05-21 ENCOUNTER — CLINICAL SUPPORT (OUTPATIENT)
Dept: CARDIAC REHAB | Age: 65
End: 2025-05-21
Payer: COMMERCIAL

## 2025-05-21 DIAGNOSIS — Z95.5 STATUS POST INSERTION OF DRUG ELUTING CORONARY ARTERY STENT: Primary | ICD-10-CM

## 2025-05-21 PROCEDURE — 93798 PHYS/QHP OP CAR RHAB W/ECG: CPT

## 2025-05-21 NOTE — PROGRESS NOTES
CARDIAC REHABILITATION   ASSESSMENT AND INDIVIDUALIZED TREATMENT PLAN  90 DAY      Today's date: 2025   # of Exercise Sessions Completed: 16  Patient name: Julienne Allan      : 1960  Age: 64 y.o.       MRN: [id number]  Referring Physician: Hermes White MD  Cardiologist: Mariann Espinosa MD   Provider: Dav  Clinician: Karlene Palencia MS, EP     SUMMARY DATE:  25    Resting BP  110/62 - 118/72,  HR 58 - 69  Exercise /72- 158/68.   - 159  Exercise session details:  40 minutes,  5.0 - 7.0 METs  Telemetry:  NSR  Symptoms: at times chest discomfort, thinking her medications is giving her heart burn  Home exercise/ADLs: walking with her , TOM workouts, yoga, sometimes pilates, varies every day, typically 1 hour daily   Patient's subjective report of progress: feeling like she is back to where she was, she was asymptomatic before her procedure, continuing to gain her strength and confidence back every day    Clinical Comments: pt has only had 3 sessions since her last ITP as she was on a work trip for over 3 weeks, will continue to progress      Treatment is tailored to this patient's individual needs.  The ITP was reviewed with the patient and all questions were answered to their satisfaction.  Additional ITP documentation can be found electronically including daily and monthly exercise summaries, daily session notes with ECG summaries, education notes, daily medication reconciliation, and daily physician supervision.      Dx:   Encounter Diagnosis   Name Primary?    Status post insertion of drug eluting coronary artery stent Yes       Description of Diagnosis: stent placement w/ PTCA LAD  Date of onset: 25  Other Cardiac History: N/A        ASSESSMENT    Medical History:   Past Medical History:   Diagnosis Date    Allergic     airborne mold and sulfadrugs    Arthritis 2015    in the hands    Asthma 1985    Dengue fever 2000    HL (hearing loss) 2001    Hear my heart beat of  chirping that interrupts hearing    Malaria 1991    Miscarriage     Otitis media 2012    once    Shingles 2004    Tonsil stone     2023    Visual impairment 2019 - flashing lights    astigmatism and short sighted since childhood, flashing ligh       Family History:  Family History   Problem Relation Age of Onset    Coronary artery disease Mother     Hypertension Mother     Heart disease Mother         by-pass surgery    Asthma Mother     Coronary artery disease Father     Dementia Father     Heart disease Father         by-pass surgery    Hearing loss Father     Thyroid cancer Sister 50    Skin cancer Sister     No Known Problems Sister     Thyroid disease Sister         removed for cancer    Cancer Sister     Arthritis Maternal Grandmother     Vision loss Maternal Grandmother         macular degeneration    Coronary artery disease Maternal Grandfather     Heart disease Maternal Grandfather     No Known Problems Paternal Grandmother     Coronary artery disease Paternal Grandfather     Heart disease Paternal Grandfather     No Known Problems Maternal Aunt     No Known Problems Paternal Aunt     Heart disease Paternal Aunt     Breast cancer Neg Hx        Allergies:   Contrast dye [iodinated contrast media], Molds & smuts, Tetanus-diphth-acell pertussis, Typhoid vaccines, Microplegia msa-msg [plegisol], and Sulfa antibiotics    Current Medications:   Current Outpatient Medications   Medication Sig Dispense Refill    albuterol (PROVENTIL HFA,VENTOLIN HFA) 90 mcg/act inhaler Inhale 2 puffs every 6 (six) hours as needed for wheezing      aspirin 81 mg chewable tablet Chew 1 tablet (81 mg total) daily      atorvastatin (LIPITOR) 40 mg tablet Take 1 tablet (40 mg total) by mouth daily 100 tablet 3    clopidogrel (Plavix) 75 mg tablet Take 1 tablet (75 mg total) by mouth daily 4 tablets on first day, then 1 tablet daily. 100 tablet 3    nitroglycerin (NITROSTAT) 0.4 mg SL tablet Place 1 tablet (0.4 mg total) under the tongue  "every 5 (five) minutes as needed for chest pain 20 tablet 3    VITAMIN D PO Take 25 mg by mouth in the morning       No current facility-administered medications for this visit.       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 [place]   ADL’s:Capable of performing light to moderate ADLs able to perform self-care resumed driving  Odd: resumed all ADLs able to perform self-care resumed driving  Home exercise: Type: walking, Frequency: 5 days/week, Distance 4  Other Comments: not doing much at this time until she starts rehab       SMART Exercise Goals:   10% improvement in functional capacity based on max METs achieved in initial fitness assessment  reduced dyspnea with physical activity    improved DASI score by 10%  increased exercise capacity by 40% based on peak METs tolerated in cardiac rehab exercise session  maintain > 150 minutes per week of moderate intensity exercise    Patient Specific EXERCISE GOALS:       Get back to exercise on her own   Feeling more confident and less anxious to exercise at home   Stronger    Functional Capacity Screening Tool:  [facility] Activity Status Index:  6.27 METs    NUTRITION ASSESSMENT:    Initial Weight:  143  Current Weight: 143    Height:   Ht Readings from Last 1 Encounters:   05/16/25 5' 3.75\" (1.619 m)       Rate Your Plate Score: 71/81    Diabetes: N/A  A1c:     last measured:     Lipid management: Last lipid profile 1/14/25  Chol 118    HDL 60  LDL 35    Current Dietary Habits:  Low sodium, low fat, lean grass fed " meat, mostly vegetarian at this point in her life, does not eat any sweets      SMART Nutrition Goals:   eat 6 or more servings of grain products per day, eat 2 or more servings of low fat milk or yogurt a day, rarely eat cheese or choose reduced fat or skim, Do not cook with oil, butter or margarine, and choose healthy snacks such as fruit, pretzels, and low fat crackers    Patient Specific NUTRITION GOALS:     1. Patient already follows vegetarian diet and has not been adding sodium or eating any trans fats or saturated fats    2. Stated her  is really strict with diet and she is glad that he is so she does not give in and cave   3. Discussed drinking enough water and getting enough protein being vegetarian (does eat eggs and fish occ)    Drug/Alcohol Use:   No      PSYCHOSOCIAL ASSESSMENT:    Date of last Assessment:  2/27/25  Depression screening:  PHQ-9 = 1    Interpretation:  1-4 = Minimal Depression  Anxiety screening:  MANDI-7 = 10    Interpretation: 10-14 = Moderate anxiety    Pt self-report of depression and anxiety   Patient reports they are coping well with good social support and denies depression or anxiety  Patient reports feelings of anxiety    Self-reported stress level:  6   Stressors:  nothing usually stressors her out but after her stents she is more anxious about everything and doing things at home   Stress Management Tools: practice Relaxation Techniques, exercise, enjoy a hobby, and spend time with family    SMART Psychosocial Goals:     Feelings in [place] Score < 3, Physical Fitness in [place] Score < 3, Daily Activity in [place] Score < 3, [facility] < 3 , and [facility] Score < 3     Patient Specific PSYCHOCOSOCIAL GOALS:    Enc to try doing something at home when she is not exercising to occupy her mind   Pt does enjoy gardening and cannot wait for the warm weather to do so   Continue to work as it does 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       OTHER CORE COMPONENT ASSESSMENT:    Tobacco Use:     N/A:  Patient is a non-smoker     Anginal Symptoms:  None   NTG use: Compliant with carrying NTG, Understands proper use, Reviewed Proper use, and Pt has not used NTG since event    SMART Goals:   consistent, controlled resting BP < 130/80, medication compliance, and reduced angina    Patient Specific CORE COMPONENT GOALS:    Reduce chest pain at the gym (educ on warm up and cool down)  Take BP at home if feeling symptomatic       INDIVIDUALIZED TREATMENT PLAN      EXERCISE GOALS and PLAN    Progress toward Exercise goals:   Pt is progressing and showing improvement  toward the following goals:  pt feeling more confident in herself, feeling more comfortable doing exercise at home, patient is doing exercise daily with her , walking for an hour, HIIT workouts, pilates using 5 lb dumbbells.  , Will continue to educate and progress as tolerated.    Exercise Plan:    education on home exercise guidelines, home exercise 30+ mins 2 days opposite CR, and Group class: Risk Factors for Heart Disease    The patient was counseled on exercise guidelines to achieve a minimum of 150 mins/wk of moderate intensity (RPE 4-6)   exercise and encouraged to add 1-2 days of exercise on opposite days of cardiac rehab as tolerated.       PHYSICIAN PRESCRIBED EXERCISE:    Current Aerobic Exercise Prescription:      Frequency: 3 days/week   Supplement with home exercise 2+ days/wk as tolerated       Minutes: 40         METS: 5.0-7.0          HR: Intensity based on RPE  4-6    RPE: 4-6         Modalities: Treadmill, Airdyne bike, Lifecycle, Elliptical, Rower, and NuStep     Exercise workloads will be progressed gradually as tolerated, within limits of patient's ability, and according to the patient's   response to the exercise program.      Aerobic Exercise Prescription Plan for Progression   Frequency: 3 days/week of cardiac rehab       Supplement with home exercise 2+ days/wk as tolerated    Minutes: 40       >150 mins/wk of moderate intensity exercise   METS: 5.5-8.0   HR: Intensity based on RPE 4-6      RPE: 4-6   Modalities: Treadmill, Airdyne bike, Lifecycle, Elliptical, and Rower    Strength trainin-3 days / week  12-15 repetitions   Modalities: Leg Press, Chest Press, Pull Downs, Seated Row, and Overhead Press    Home Exercise: Type: walking, HIIT, yoga, Frequency: 5 days/week, Duration: 60 mins, Distance 4.0 miles    Exercise Education: benefit of exercise for CAD risk factors, AHA guidelines to achieve >150 mins/wk of moderate exercise, RPE scale, and physical activity/exercise in extreme weather conditions     Readiness to change: Maintenance: (Maintaining the behavior change)      NUTRITION GOALS AND PLAN      Nutritional   Reviewed patient's Rate your Plate. Discussed key elements of heart healthy eating. Reviewed patient goals for dietary modifications and their clinical implications.  Reviewed most recent lipid profile.     Patient's progress toward Nutrition goals:    Pt is progressing and showing improvement  toward the following goals:  pt eating less sugary treats, low salt diet, mainly vegetarian but does eat turkey and at times chicken, eats eggs and oatmeal, doing well with her diet,  is always watching after her, when they go on vacation she is really good about what she chooses, drinking enough water  .  , Will continue to educate and progress as tolerated.      Nutrition Plan:   group class: Reading Food Labels, group Class: Heart Healthy  Eating, replace refined flours with whole grains, and limit meat intake to less than 6oz per day    Measurable goals were based Rate Your Plate Dietary Self-Assessment. These are the areas in which the patient could score higher on the assessment.  Goals include recommendations for a heart healthy diet based on American Heart Association.    Nutrition Education:   heart healthy eating principles  maintaining hydration  healthy choices while dining out  group class: Heart Healthy Eating  group class:  Label Reading    Readiness to change: Action:  (Changing behavior)      PSYCHOSOCIAL GOALS AND PLAN    Psychosocial Assessment as it relates to rehabilitation:   Patient denies issues with his/her family or home life that may affect their rehabilitation efforts.     Patient's progress toward Psychosocial goals:    Pt is progressing and showing improvement  toward the following goals:  no trouble at this time, doing well, great support at home, always with family, vacation.  , Will continue to educate and progress as tolerated.    Psychosocial Intervention/plan:   Practice relaxation techniques, Exercise, Spend time outdoors, and gardening     Psychosocial Education: benefits of a positive support system and stress management techniques    Information to utilize [employer] was provided as well as contact information for counseling through  Behavioral Health and group psychotherapy groups available.    Readiness to change: Action:  (Changing behavior)      OTHER CORE COMPONENTS GOALS and PLAN      Blood Pressure will be monitored throughout the program and cardiologist will be notified of elevated trends.    Pt will be encouraged to monitor home BP if advised by cardiologist.    Tobacco Plan:   N/A:  Pt is a non-smoker    Progress toward Core Component goals:   Pt is progressing and showing improvement  toward the following goals:  no side effects from any medications at this time, doing well at home, no trouble,  getting some heart burn feeling that she feels may be from a medication, will follow up with this in a few weeks  .  , Will continue to educate and progress as tolerated.    Other Core Components Intervention:   group class: Common Heart Medications, avoid processed foods, engage in regular exercise, check labels for sodium content, and monitor home BP    Group and Individual Education:  proper use of sublingual NTG, group class: Understanding Heart Disease, and group class:  Common Heart Medications    Readiness to change: Action:  (Changing behavior)

## 2025-05-23 ENCOUNTER — RESULTS FOLLOW-UP (OUTPATIENT)
Dept: INTERNAL MEDICINE CLINIC | Facility: CLINIC | Age: 65
End: 2025-05-23

## 2025-05-23 ENCOUNTER — CLINICAL SUPPORT (OUTPATIENT)
Dept: CARDIAC REHAB | Age: 65
End: 2025-05-23
Payer: COMMERCIAL

## 2025-05-23 DIAGNOSIS — Z95.5 STATUS POST INSERTION OF DRUG ELUTING CORONARY ARTERY STENT: Primary | ICD-10-CM

## 2025-05-23 PROCEDURE — 93798 PHYS/QHP OP CAR RHAB W/ECG: CPT

## 2025-05-27 ENCOUNTER — CLINICAL SUPPORT (OUTPATIENT)
Dept: CARDIAC REHAB | Age: 65
End: 2025-05-27
Payer: COMMERCIAL

## 2025-05-27 DIAGNOSIS — Z95.5 STATUS POST INSERTION OF DRUG ELUTING CORONARY ARTERY STENT: Primary | ICD-10-CM

## 2025-05-27 PROCEDURE — 93798 PHYS/QHP OP CAR RHAB W/ECG: CPT

## 2025-05-28 ENCOUNTER — CLINICAL SUPPORT (OUTPATIENT)
Dept: CARDIAC REHAB | Age: 65
End: 2025-05-28
Payer: COMMERCIAL

## 2025-05-28 DIAGNOSIS — Z95.5 STATUS POST INSERTION OF DRUG ELUTING CORONARY ARTERY STENT: Primary | ICD-10-CM

## 2025-05-28 PROCEDURE — 93798 PHYS/QHP OP CAR RHAB W/ECG: CPT

## 2025-05-28 NOTE — PROGRESS NOTES
Please see attached exercise session detail.    Risks/benefits discussed with the parent(s)/legal guardian/emancipated minor

## 2025-05-29 ENCOUNTER — APPOINTMENT (OUTPATIENT)
Dept: CARDIAC REHAB | Age: 65
End: 2025-05-29
Payer: COMMERCIAL

## 2025-05-30 ENCOUNTER — CLINICAL SUPPORT (OUTPATIENT)
Dept: CARDIAC REHAB | Age: 65
End: 2025-05-30
Payer: COMMERCIAL

## 2025-05-30 DIAGNOSIS — Z95.5 STATUS POST INSERTION OF DRUG ELUTING CORONARY ARTERY STENT: Primary | ICD-10-CM

## 2025-05-30 PROCEDURE — 93798 PHYS/QHP OP CAR RHAB W/ECG: CPT

## 2025-06-02 ENCOUNTER — CLINICAL SUPPORT (OUTPATIENT)
Dept: CARDIAC REHAB | Age: 65
End: 2025-06-02
Payer: COMMERCIAL

## 2025-06-02 DIAGNOSIS — Z95.5 STATUS POST INSERTION OF DRUG ELUTING CORONARY ARTERY STENT: Primary | ICD-10-CM

## 2025-06-02 PROCEDURE — 93798 PHYS/QHP OP CAR RHAB W/ECG: CPT

## 2025-06-04 ENCOUNTER — CLINICAL SUPPORT (OUTPATIENT)
Dept: CARDIAC REHAB | Age: 65
End: 2025-06-04
Payer: COMMERCIAL

## 2025-06-04 DIAGNOSIS — Z95.5 STATUS POST INSERTION OF DRUG ELUTING CORONARY ARTERY STENT: Primary | ICD-10-CM

## 2025-06-04 PROCEDURE — 93798 PHYS/QHP OP CAR RHAB W/ECG: CPT

## 2025-06-06 ENCOUNTER — CLINICAL SUPPORT (OUTPATIENT)
Dept: CARDIAC REHAB | Age: 65
End: 2025-06-06
Payer: COMMERCIAL

## 2025-06-06 DIAGNOSIS — Z95.5 STATUS POST INSERTION OF DRUG ELUTING CORONARY ARTERY STENT: Primary | ICD-10-CM

## 2025-06-06 PROCEDURE — 93798 PHYS/QHP OP CAR RHAB W/ECG: CPT

## 2025-06-09 ENCOUNTER — CLINICAL SUPPORT (OUTPATIENT)
Dept: CARDIAC REHAB | Age: 65
End: 2025-06-09
Payer: COMMERCIAL

## 2025-06-09 DIAGNOSIS — Z95.5 STATUS POST INSERTION OF DRUG ELUTING CORONARY ARTERY STENT: Primary | ICD-10-CM

## 2025-06-09 PROCEDURE — 93798 PHYS/QHP OP CAR RHAB W/ECG: CPT

## 2025-06-11 ENCOUNTER — CLINICAL SUPPORT (OUTPATIENT)
Dept: CARDIAC REHAB | Age: 65
End: 2025-06-11
Payer: COMMERCIAL

## 2025-06-11 DIAGNOSIS — Z95.5 STATUS POST INSERTION OF DRUG ELUTING CORONARY ARTERY STENT: Primary | ICD-10-CM

## 2025-06-11 PROCEDURE — 93798 PHYS/QHP OP CAR RHAB W/ECG: CPT

## 2025-06-13 ENCOUNTER — CLINICAL SUPPORT (OUTPATIENT)
Dept: CARDIAC REHAB | Age: 65
End: 2025-06-13
Payer: COMMERCIAL

## 2025-06-13 DIAGNOSIS — Z95.5 STATUS POST INSERTION OF DRUG ELUTING CORONARY ARTERY STENT: Primary | ICD-10-CM

## 2025-06-13 PROCEDURE — 93798 PHYS/QHP OP CAR RHAB W/ECG: CPT

## 2025-06-16 ENCOUNTER — CLINICAL SUPPORT (OUTPATIENT)
Dept: CARDIAC REHAB | Age: 65
End: 2025-06-16
Payer: COMMERCIAL

## 2025-06-16 DIAGNOSIS — Z95.5 STATUS POST INSERTION OF DRUG ELUTING CORONARY ARTERY STENT: Primary | ICD-10-CM

## 2025-06-16 PROCEDURE — 93798 PHYS/QHP OP CAR RHAB W/ECG: CPT

## 2025-06-18 ENCOUNTER — CLINICAL SUPPORT (OUTPATIENT)
Dept: CARDIAC REHAB | Age: 65
End: 2025-06-18
Payer: COMMERCIAL

## 2025-06-18 DIAGNOSIS — Z95.5 STATUS POST INSERTION OF DRUG ELUTING CORONARY ARTERY STENT: Primary | ICD-10-CM

## 2025-06-18 PROCEDURE — 93798 PHYS/QHP OP CAR RHAB W/ECG: CPT

## 2025-06-20 ENCOUNTER — CLINICAL SUPPORT (OUTPATIENT)
Dept: CARDIAC REHAB | Age: 65
End: 2025-06-20
Payer: COMMERCIAL

## 2025-06-20 DIAGNOSIS — Z95.5 STATUS POST INSERTION OF DRUG ELUTING CORONARY ARTERY STENT: Primary | ICD-10-CM

## 2025-06-20 PROCEDURE — 93798 PHYS/QHP OP CAR RHAB W/ECG: CPT

## 2025-06-23 ENCOUNTER — CLINICAL SUPPORT (OUTPATIENT)
Dept: CARDIAC REHAB | Age: 65
End: 2025-06-23
Payer: COMMERCIAL

## 2025-06-23 DIAGNOSIS — Z95.5 STATUS POST INSERTION OF DRUG ELUTING CORONARY ARTERY STENT: Primary | ICD-10-CM

## 2025-06-23 PROCEDURE — 93798 PHYS/QHP OP CAR RHAB W/ECG: CPT

## 2025-06-23 NOTE — PROGRESS NOTES
CARDIAC REHABILITATION   ASSESSMENT AND INDIVIDUALIZED TREATMENT PLAN  90 DAY      Today's date: 2025   # of Exercise Sessions Completed: 30  Patient name: Julienne Allan      : 1960  Age: 64 y.o.       MRN: [id number]  Referring Physician: Hermes White MD  Cardiologist: Mariann Espinosa MD   Provider: Clark Mills  Clinician:Jannette Rudolph MS, CEP    SUMMARY DATE:  25    Resting BP  104//70,  HR 60-77  Exercise BP 1142//62   - 167  Exercise session details:  40 minutes,  5.0 - 8.1 METs  Telemetry:  NSR  Symptoms: at times chest discomfort while she is sleeping only.  Home exercise/ADLs: walking with her , TOM workouts, yoga, sometimes pilates, varies every day, typically 1 hour daily   Patient's subjective report of progress: feeling like she is back to where she was, she was asymptomatic before her procedure, continuing to gain her strength and confidence back every day      Treatment is tailored to this patient's individual needs.  The ITP was reviewed with the patient and all questions were answered to their satisfaction.  Additional ITP documentation can be found electronically including daily and monthly exercise summaries, daily session notes with ECG summaries, education notes, daily medication reconciliation, and daily physician supervision.      Dx:   Encounter Diagnosis   Name Primary?    Status post insertion of drug eluting coronary artery stent Yes         Description of Diagnosis: stent placement w/ PTCA LAD  Date of onset: 25  Other Cardiac History: N/A        ASSESSMENT    Medical History:   Past Medical History:   Diagnosis Date    Allergic 1984    airborne mold and sulfadrugs    Arthritis 2015    in the hands    Asthma 1985    Dengue fever 2000    HL (hearing loss) 2001    Hear my heart beat of chirping that interrupts hearing    Malaria     Miscarriage     Otitis media 2012    once    Shingles 2004    Tonsil stone         Visual impairment 2019 -  flashing lights    astigmatism and short sighted since childhood, flashing ligh       Family History:  Family History   Problem Relation Name Age of Onset    Coronary artery disease Mother Amber Lam     Hypertension Mother Amber Lam     Heart disease Mother Amber Lam         by-pass surgery    Asthma Mother Amber Lam     Coronary artery disease Father Harish Lam     Dementia Father Harish Lam     Heart disease Father Harish Lam         by-pass surgery    Hearing loss Father Harish Lam     Thyroid cancer Sister  50    Skin cancer Sister      No Known Problems Sister      Thyroid disease Sister Lizbeth Garay         removed for cancer    Cancer Sister Lizbeth Garay     Arthritis Maternal Grandmother Bel York     Vision loss Maternal Grandmother Bel [place]         macular degeneration    Coronary artery disease Maternal Grandfather Gino York     Heart disease Maternal Grandfather Gino York     No Known Problems Paternal Grandmother      Coronary artery disease Paternal Grandfather Miky Lam     Heart disease Paternal Grandfather Miky Lam     No Known Problems Maternal Aunt      No Known Problems Paternal Aunt      Heart disease Paternal Aunt Joy Patricia     Breast cancer Neg Hx         Allergies:   Contrast dye [iodinated contrast media], Molds & smuts, Tetanus-diphth-acell pertussis, Typhoid vaccines, Microplegia msa-msg [plegisol], and Sulfa antibiotics    Current Medications:   Current Outpatient Medications   Medication Sig Dispense Refill    albuterol (PROVENTIL HFA,VENTOLIN HFA) 90 mcg/act inhaler Inhale 2 puffs every 6 (six) hours as needed for wheezing      aspirin 81 mg chewable tablet Chew 1 tablet (81 mg total) daily      atorvastatin (LIPITOR) 40 mg tablet Take 1 tablet (40 mg total) by mouth daily 100 tablet 3    clopidogrel (Plavix) 75 mg tablet Take 1 tablet (75 mg total) by mouth daily 4 tablets on first day, then 1 tablet  "daily. 100 tablet 3    nitroglycerin (NITROSTAT) 0.4 mg SL tablet Place 1 tablet (0.4 mg total) under the tongue every 5 (five) minutes as needed for chest pain 20 tablet 3    VITAMIN D PO Take 25 mg by mouth in the morning       No current facility-administered medications for this visit.       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 [place]   ADL’s:Capable of performing light to moderate ADLs able to perform self-care resumed driving  [place]: resumed all ADLs able to perform self-care resumed driving  Home exercise: Type: walking, Frequency: 5 days/week, Distance 4  Other Comments: not doing much at this time until she starts rehab       SMART Exercise Goals:   10% improvement in functional capacity based on max METs achieved in initial fitness assessment  reduced dyspnea with physical activity    improved DASI score by 10%  increased exercise capacity by 40% based on peak METs tolerated in cardiac rehab exercise session  maintain > 150 minutes per week of moderate intensity exercise    Patient Specific EXERCISE GOALS:       Get back to exercise on her own   Feeling more confident and less anxious to exercise at home   Stronger    Functional Capacity Screening Tool:  [facility] Activity Status Index:  6.27 METs    NUTRITION ASSESSMENT:    Initial Weight:  143  Current Weight: 141    Height:   Ht Readings from Last 1 Encounters:   05/16/25 5' 3.75\" (1.619 m)       Rate Your Plate Score: 71/81    Diabetes: N/A  A1c:     last measured:     Lipid management: Last lipid " profile 1/14/25  Chol 118    HDL 60  LDL 35    Current Dietary Habits:  Low sodium, low fat, lean grass fed meat, mostly vegetarian at this point in her life, does not eat any sweets      SMART Nutrition Goals:   eat 6 or more servings of grain products per day, eat 2 or more servings of low fat milk or yogurt a day, rarely eat cheese or choose reduced fat or skim, Do not cook with oil, butter or margarine, and choose healthy snacks such as fruit, pretzels, and low fat crackers    Patient Specific NUTRITION GOALS:     1. Patient already follows vegetarian diet and has not been adding sodium or eating any trans fats or saturated fats    2. Stated her  is really strict with diet and she is glad that he is so she does not give in and cave   3. Discussed drinking enough water and getting enough protein being vegetarian (does eat eggs and fish occ)    Drug/Alcohol Use:   No      PSYCHOSOCIAL ASSESSMENT:    Date of last Assessment:  2/27/25  Depression screening:  PHQ-9 = 1    Interpretation:  1-4 = Minimal Depression  Anxiety screening:  MANDI-7 = 10    Interpretation: 10-14 = Moderate anxiety    Pt self-report of depression and anxiety   Patient reports they are coping well with good social support and denies depression or anxiety  Patient reports feelings of anxiety    Self-reported stress level:  6   Stressors:  nothing usually stressors her out but after her stents she is more anxious about everything and doing things at home   Stress Management Tools: practice Relaxation Techniques, exercise, enjoy a hobby, and spend time with family    SMART Psychosocial Goals:     Feelings in [place] Score < 3, Physical Fitness in [place] Score < 3, Daily Activity in [place] Score < 3, Quality of Life in [facility] Score < 3 , and Change in Health in [facility] Score < 3     Patient Specific PSYCHOCOSOCIAL GOALS:    Enc to try doing something at home when she is not exercising to occupy her mind   Pt does enjoy  gardening and cannot wait for the warm weather to do so   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       OTHER CORE COMPONENT ASSESSMENT:    Tobacco Use:     N/A:  Patient is a non-smoker     Anginal Symptoms:  None   NTG use: Compliant with carrying NTG, Understands proper use, Reviewed Proper use, and Pt has not used NTG since event    SMART Goals:   consistent, controlled resting BP < 130/80, medication compliance, and reduced angina    Patient Specific CORE COMPONENT GOALS:    Reduce chest pain at the gym (educ on warm up and cool down)  Take BP at home if feeling symptomatic       INDIVIDUALIZED TREATMENT PLAN      EXERCISE GOALS and PLAN    Progress toward Exercise goals:   Pt is progressing and showing improvement  toward the following goals:  pt feeling more confident in herself, feeling more comfortable doing exercise at home, patient is doing exercise daily with her , walking for an hour, HIIT workouts, pilates using 5 lb dumbbells.  , Will continue to educate and progress as tolerated.    Exercise Plan:    education on home exercise guidelines, home exercise 30+ mins 2 days opposite CR, and Group class: Risk Factors for Heart Disease    The patient was counseled on exercise guidelines to achieve a minimum of 150 mins/wk of moderate intensity (RPE 4-6)   exercise and encouraged to add 1-2 days of exercise on opposite days of cardiac rehab as tolerated.       PHYSICIAN PRESCRIBED EXERCISE:    Current Aerobic Exercise Prescription:      Frequency: 3 days/week   Supplement  with home exercise 2+ days/wk as tolerated       Minutes: 40         METS: 5.0-8.1         HR: Intensity based on RPE 4-6    RPE: 4-6         Modalities: Treadmill, Airdyne bike, Lifecycle, Elliptical, Rower, and NuStep     Exercise workloads will be progressed gradually as tolerated, within limits of patient's ability, and according to the patient's   response to the exercise program.      Aerobic Exercise Prescription Plan for Progression   Frequency: 3 days/week of cardiac rehab       Supplement with home exercise 2+ days/wk as tolerated    Minutes: 40       >150 mins/wk of moderate intensity exercise   METS: 5.5-8.0   HR: Intensity based on RPE 4-6      RPE: 4-6   Modalities: Treadmill, Airdyne bike, Lifecycle, Elliptical, and Rower    Strength trainin-3 days / week  12-15 repetitions   Modalities: Leg Press, Chest Press, Pull Downs, Seated Row, and Overhead Press    Home Exercise: Type: walking, HIIT, yoga, Frequency: 5 days/week, Duration: 60 mins, Distance 4.0 miles    Exercise Education: benefit of exercise for CAD risk factors, AHA guidelines to achieve >150 mins/wk of moderate exercise, RPE scale, class: Risk Factors for Heart Disease, and Class: Physical activity/exercise in extreme weather conditions     Readiness to change: Maintenance: (Maintaining the behavior change)      NUTRITION GOALS AND PLAN      Nutritional   Reviewed patient's Rate your Plate. Discussed key elements of heart healthy eating. Reviewed patient goals for dietary modifications and their clinical implications.  Reviewed most recent lipid profile.     Patient's progress toward Nutrition goals:    Pt is progressing and showing improvement  toward the following goals:  pt eating less sugary treats, low salt diet, mainly vegetarian but does eat turkey and at times chicken, eats eggs and oatmeal, doing well with her diet,  is always watching after her, when they go on vacation she is really good about what she chooses,  drinking enough water  .  , Will continue to educate and progress as tolerated.      Nutrition Plan:   group class: Reading Food Labels, group Class: Heart Healthy Eating, replace refined flours with whole grains, and limit meat intake to less than 6oz per day    Measurable goals were based Rate Your Plate Dietary Self-Assessment. These are the areas in which the patient could score higher on the assessment.  Goals include recommendations for a heart healthy diet based on American Heart Association.    Nutrition Education:   heart healthy eating principles  maintaining hydration  healthy choices while dining out  group class: Heart Healthy Eating  group class:  Label Reading    Readiness to change: Action:  (Changing behavior)      PSYCHOSOCIAL GOALS AND PLAN    Psychosocial Assessment as it relates to rehabilitation:   Patient denies issues with his/her family or home life that may affect their rehabilitation efforts.     Patient's progress toward Psychosocial goals:    Pt is progressing and showing improvement  toward the following goals:  no trouble at this time, doing well, great support at home, always with family, vacation.  , Will continue to educate and progress as tolerated.    Psychosocial Intervention/plan:   Practice relaxation techniques, Exercise, Spend time outdoors, and gardening     Psychosocial Education: benefits of a positive support system and stress management techniques    Information to utilize [employer] was provided as well as contact information for counseling through  Behavioral Health and group psychotherapy groups available.    Readiness to change: Action:  (Changing behavior)      OTHER CORE COMPONENTS GOALS and PLAN      Blood Pressure will be monitored throughout the program and cardiologist will be notified of elevated trends.    Pt will be encouraged to monitor home BP if advised by cardiologist.    Tobacco Plan:   N/A:  Pt is a non-smoker    Progress toward Core Component  goals:   Pt is progressing and showing improvement  toward the following goals:  no side effects from any medications at this time, doing well at home, no trouble, getting some heart burn feeling that she feels may be from a medication, switched from brilinta to plavix  .  , Will continue to educate and progress as tolerated.    Other Core Components Intervention:   monitor home BP  check labels for sodium content  avoid processed foods  engage in regular exercise for BP control    Group and Individual Education:  proper use of sublingual NTG , class: Understanding Heart Disease, and class:  Common Heart Medications    Readiness to change: Action:  (Changing behavior)

## 2025-06-25 ENCOUNTER — CLINICAL SUPPORT (OUTPATIENT)
Dept: CARDIAC REHAB | Age: 65
End: 2025-06-25
Payer: COMMERCIAL

## 2025-06-25 DIAGNOSIS — Z95.5 STATUS POST INSERTION OF DRUG ELUTING CORONARY ARTERY STENT: Primary | ICD-10-CM

## 2025-06-25 PROCEDURE — 93798 PHYS/QHP OP CAR RHAB W/ECG: CPT

## 2025-06-27 ENCOUNTER — CLINICAL SUPPORT (OUTPATIENT)
Dept: CARDIAC REHAB | Age: 65
End: 2025-06-27
Payer: COMMERCIAL

## 2025-06-27 DIAGNOSIS — Z95.5 STATUS POST INSERTION OF DRUG ELUTING CORONARY ARTERY STENT: Primary | ICD-10-CM

## 2025-06-27 PROCEDURE — 93798 PHYS/QHP OP CAR RHAB W/ECG: CPT

## 2025-06-30 ENCOUNTER — CLINICAL SUPPORT (OUTPATIENT)
Dept: CARDIAC REHAB | Age: 65
End: 2025-06-30
Payer: COMMERCIAL

## 2025-06-30 DIAGNOSIS — Z95.5 STATUS POST INSERTION OF DRUG ELUTING CORONARY ARTERY STENT: Primary | ICD-10-CM

## 2025-06-30 PROCEDURE — 93798 PHYS/QHP OP CAR RHAB W/ECG: CPT

## 2025-07-01 ENCOUNTER — HOSPITAL ENCOUNTER (OUTPATIENT)
Dept: RADIOLOGY | Age: 65
Discharge: HOME | End: 2025-07-01
Payer: COMMERCIAL

## 2025-07-01 VITALS — WEIGHT: 141 LBS | HEIGHT: 64 IN | BODY MASS INDEX: 24.07 KG/M2

## 2025-07-01 DIAGNOSIS — Z12.31 ENCOUNTER FOR SCREENING MAMMOGRAM FOR MALIGNANT NEOPLASM OF BREAST: ICD-10-CM

## 2025-07-01 PROCEDURE — 77067 SCR MAMMO BI INCL CAD: CPT

## 2025-07-01 PROCEDURE — 77063 BREAST TOMOSYNTHESIS BI: CPT

## 2025-07-02 ENCOUNTER — CLINICAL SUPPORT (OUTPATIENT)
Dept: CARDIAC REHAB | Age: 65
End: 2025-07-02
Payer: COMMERCIAL

## 2025-07-02 DIAGNOSIS — Z95.5 STATUS POST INSERTION OF DRUG ELUTING CORONARY ARTERY STENT: Primary | ICD-10-CM

## 2025-07-02 PROCEDURE — 93798 PHYS/QHP OP CAR RHAB W/ECG: CPT

## 2025-07-07 ENCOUNTER — CLINICAL SUPPORT (OUTPATIENT)
Dept: CARDIAC REHAB | Age: 65
End: 2025-07-07
Payer: COMMERCIAL

## 2025-07-07 DIAGNOSIS — Z95.5 STATUS POST INSERTION OF DRUG ELUTING CORONARY ARTERY STENT: Primary | ICD-10-CM

## 2025-07-07 PROCEDURE — 93798 PHYS/QHP OP CAR RHAB W/ECG: CPT

## 2025-07-09 ENCOUNTER — CLINICAL SUPPORT (OUTPATIENT)
Dept: CARDIAC REHAB | Age: 65
End: 2025-07-09
Payer: COMMERCIAL

## 2025-07-09 DIAGNOSIS — Z95.5 STATUS POST INSERTION OF DRUG ELUTING CORONARY ARTERY STENT: Primary | ICD-10-CM

## 2025-07-09 PROCEDURE — 93798 PHYS/QHP OP CAR RHAB W/ECG: CPT

## 2025-07-09 NOTE — PROGRESS NOTES
CARDIAC REHABILITATION   ASSESSMENT AND INDIVIDUALIZED TREATMENT PLAN  DISCHARGE        Today's date: 2025   # of Exercise Sessions Completed: 36  Patient name: Julienne Allan      : 1960  Age: 64 y.o.       MRN: [id number]  Referring Physician: Hermes White MD  Cardiologist: Mariann Espinosa MD   Provider: [place]  Clinician: Karlene Palencia MS, EP     DISCHARGE SUMMARY  2025    Completed 36/36 exercise sessions  Functional capacity:   Pre: 7.5 METs, Post: 11.9 METs  Max METs tolerated in cardiac rehab:  Pre: 4.1,  Post: 5.4  [facility] QOL:  Pre: 25, Post: 12  PHQ-9:  Pre: 2, Post: 0  Weight:  Pre: 143,  Post: 139  Exercise session details:  45 minutes,  6.4 - 8.6 METs  Resting BP  112/64 - 128/74,  HR 53 - 63  Exercise /76- 160/62.   - 156  Telemetry:  NSR  Symptoms: N/A  Discharge Plans: patient plans to continue exercise at home, pt and her  go walking almost daily for a few miles, they also do HIIT workouts, pilates, yoga, patient also bikes that they will go out and bike   Patient's subjective report of progress: pt feeling good, really appreciative of all of our help her, feels more confident, stronger, more energy       SUMMARY DATE:  25    Resting BP  104//70,  HR 60-77  Exercise BP 1142//62   - 167  Exercise session details:  40 minutes,  5.0 - 8.1 METs  Telemetry:  NSR  Symptoms: at times chest discomfort while she is sleeping only.  Home exercise/ADLs: walking with her , TOM workouts, yoga, sometimes pilates, varies every day, typically 1 hour daily   Patient's subjective report of progress: feeling like she is back to where she was, she was asymptomatic before her procedure, continuing to gain her strength and confidence back every day      Treatment is tailored to this patient's individual needs.  The ITP was reviewed with the patient and all questions were answered to their satisfaction.  Additional ITP documentation can be found  electronically including daily and monthly exercise summaries, daily session notes with ECG summaries, education notes, daily medication reconciliation, and daily physician supervision.      Dx:   Encounter Diagnosis   Name Primary?    Status post insertion of drug eluting coronary artery stent Yes         Description of Diagnosis: stent placement w/ PTCA LAD  Date of onset: 2/7/25  Other Cardiac History: N/A        ASSESSMENT    Medical History:   Past Medical History:   Diagnosis Date    Allergic 1984    airborne mold and sulfadrugs    Arthritis 2015    in the hands    Asthma 1985    Dengue fever 2000    HL (hearing loss) 2001    Hear my heart beat of chirping that interrupts hearing    Malaria 1991    Miscarriage     Otitis media 2012    once    Shingles 2004    Tonsil stone     2023    Visual impairment 2019 - flashing lights    astigmatism and short sighted since childhood, flashing ligh       Family History:  Family History   Problem Relation Name Age of Onset    Coronary artery disease Mother Amber Lam     Hypertension Mother Amber Lam     Heart disease Mother Amber Lam         by-pass surgery    Asthma Mother Amber Lam     Coronary artery disease Father Harish Lam     Dementia Father Harish Lam     Heart disease Father Harish Lam         by-pass surgery    Hearing loss Father Harish Lam     Thyroid cancer Sister  50    Skin cancer Sister      No Known Problems Sister      Thyroid disease Sister Lizbeth Garay         removed for cancer    Cancer Sister Lizbeth Garay     Arthritis Maternal Grandmother Bel York     Vision loss Maternal Grandmother Bel [place]         macular degeneration    Coronary artery disease Maternal Grandfather Gino York     Heart disease Maternal Grandfather Gino York     No Known Problems Paternal Grandmother      Coronary artery disease Paternal Grandfather Miky Lam     Heart disease Paternal Grandfather Miky  Carole     No Known Problems Maternal Aunt      No Known Problems Paternal Aunt      Heart disease Paternal Aunt Joy Patricia     Breast cancer Neg Hx         Allergies:   Contrast dye [iodinated contrast media], Molds & smuts, Tetanus-diphth-acell pertussis, Typhoid vaccines, Microplegia msa-msg [plegisol], and Sulfa antibiotics    Current Medications:   Current Outpatient Medications   Medication Sig Dispense Refill    albuterol (PROVENTIL HFA,VENTOLIN HFA) 90 mcg/act inhaler Inhale 2 puffs every 6 (six) hours as needed for wheezing      aspirin 81 mg chewable tablet Chew 1 tablet (81 mg total) daily      atorvastatin (LIPITOR) 40 mg tablet Take 1 tablet (40 mg total) by mouth daily 100 tablet 3    clopidogrel (Plavix) 75 mg tablet Take 1 tablet (75 mg total) by mouth daily 4 tablets on first day, then 1 tablet daily. 100 tablet 3    nitroglycerin (NITROSTAT) 0.4 mg SL tablet Place 1 tablet (0.4 mg total) under the tongue every 5 (five) minutes as needed for chest pain 20 tablet 3    VITAMIN D PO Take 25 mg by mouth in the morning       No current facility-administered medications for this visit.       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 [place]   ADL’s:Capable of performing light to moderate ADLs able to perform self-care resumed driving  [place]: resumed all ADLs able to perform self-care resumed driving  Home exercise: Type: walking, Frequency: 5 days/week, Distance 4  Other Comments: not doing much at this time until she starts rehab       SMART Exercise  "Goals:   10% improvement in functional capacity based on max METs achieved in initial fitness assessment  reduced dyspnea with physical activity    improved DASI score by 10%  increased exercise capacity by 40% based on peak METs tolerated in cardiac rehab exercise session  maintain > 150 minutes per week of moderate intensity exercise    Patient Specific EXERCISE GOALS:       Get back to exercise on her own   Feeling more confident and less anxious to exercise at home   Stronger    Functional Capacity Screening Tool:  [facility] Activity Status Index: 9.89 METs    NUTRITION ASSESSMENT:    Initial Weight:  143  Current Weight: 139    Height:   Ht Readings from Last 1 Encounters:   07/01/25 5' 3.75\" (1.619 m)       Rate Your Plate Score: 73/81    Diabetes: N/A  A1c:     last measured:     Lipid management: Last lipid profile 1/14/25  Chol 118    HDL 60  LDL 35  No recent lipid panel since Jan.   Current Dietary Habits:  Low sodium, low fat, lean grass fed meat, mostly vegetarian at this point in her life, does not eat any sweets      SMART Nutrition Goals:   eat 6 or more servings of grain products per day, eat 2 or more servings of low fat milk or yogurt a day, rarely eat cheese or choose reduced fat or skim, Do not cook with oil, butter or margarine, and choose healthy snacks such as fruit, pretzels, and low fat crackers    Patient Specific NUTRITION GOALS:     1. Patient already follows vegetarian diet and has not been adding sodium or eating any trans fats or saturated fats    2. Stated her  is really strict with diet and she is glad that he is so she does not give in and cave   3. Discussed drinking enough water and getting enough protein being vegetarian (does eat eggs and fish occ)    Drug/Alcohol Use:   No      PSYCHOSOCIAL ASSESSMENT:    Date of last Assessment:  7/9/25  Depression screening:  PHQ-9 = 0    Interpretation:  0 =No Depression  Anxiety screening:  MANDI-7 = 0  Interpretation: 0-4  = Not " anxious    Pt self-report of depression and anxiety   Patient reports they are coping well with good social support and denies depression or anxiety  Patient reports feelings of anxiety    Self-reported stress level:  5   Stressors:  nothing usually stressors her out but after her stents she is more anxious about everything and doing things at home   Stress Management Tools: practice Relaxation Techniques, exercise, enjoy a hobby, and spend time with family    SMART Psychosocial Goals:     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       OTHER CORE COMPONENT ASSESSMENT:    Tobacco Use:     N/A:  Patient is a non-smoker     Anginal Symptoms:  None   NTG use: Compliant with carrying NTG, Understands proper use, Reviewed Proper use, and Pt has not used NTG since event    SMART Goals:   consistent, controlled resting BP < 130/80, medication compliance, and reduced angina    Patient Specific CORE COMPONENT GOALS:    Reduce chest pain at the gym (educ on warm up and cool down)  Take BP at home if feeling symptomatic       INDIVIDUALIZED TREATMENT PLAN      EXERCISE GOALS and PLAN    Progress toward  Exercise goals:   Goals met: pt is back to doing exercise at home on her own, patient doing pilates, HIIT, biking, walking, feels stronger, stamina has improved, more confident  ., Patient will be encouraged to focus on lifestyle modifications following discharge.    Exercise Plan:    education on home exercise guidelines, home exercise 30+ mins 2 days opposite CR, and Group class: Risk Factors for Heart Disease    The patient was counseled on exercise guidelines to achieve a minimum of 150 mins/wk of moderate intensity (RPE 4-6)   exercise and encouraged to add 1-2 days of exercise on opposite days of cardiac rehab as tolerated.       PHYSICIAN PRESCRIBED EXERCISE:    Current Aerobic Exercise Prescription:      Frequency: 3 days/week   Supplement with home exercise 2+ days/wk as tolerated       Minutes: 40         METS: 6.4-8.6        HR: Intensity based on RPE 4-6    RPE: 4-6         Modalities: Treadmill, Airdyne bike, Lifecycle, Elliptical, Rower, and NuStep     Exercise workloads will be progressed gradually as tolerated, within limits of patient's ability, and according to the patient's   response to the exercise program.      Aerobic Exercise Prescription Plan for Progression   Frequency: 3 days/week of cardiac rehab       Supplement with home exercise 2+ days/wk as tolerated    Minutes: 40       >150 mins/wk of moderate intensity exercise   METS: 5.5-9.0   HR: Intensity based on RPE 4-6      RPE: 4-6   Modalities: Treadmill, Airdyne bike, Lifecycle, Elliptical, and Rower    Strength trainin-3 days / week  12-15 repetitions   Modalities: Leg Press, Chest Press, Pull Downs, Seated Row, and Overhead Press    Home Exercise: Type: walking, HIIT, yoga, Frequency: 5 days/week, Duration: 60 mins, Distance 4.0 miles    Exercise Education: benefit of exercise for CAD risk factors, AHA guidelines to achieve >150 mins/wk of moderate exercise, RPE scale, class: Risk Factors for Heart Disease, and Class: Physical  activity/exercise in extreme weather conditions     Readiness to change: Maintenance: (Maintaining the behavior change)      NUTRITION GOALS AND PLAN      Nutritional   Reviewed patient's Rate your Plate. Discussed key elements of heart healthy eating. Reviewed patient goals for dietary modifications and their clinical implications.  Reviewed most recent lipid profile.     Patient's progress toward Nutrition goals:    Goals met: pt lost 2 lbs since starting rehab, pt follows vegetarian diet and is doing well, getting enough proteins, fibers, fruits, vegetables, portion sizes are smaller, sweets are minimal, rarely uses sodium, no sugar added., Patient will be encouraged to focus on lifestyle modifications following discharge.      Nutrition Plan:   group class: Reading Food Labels, group Class: Heart Healthy Eating, replace refined flours with whole grains, and limit meat intake to less than 6oz per day    Measurable goals were based Rate Your Plate Dietary Self-Assessment. These are the areas in which the patient could score higher on the assessment.  Goals include recommendations for a heart healthy diet based on American Heart Association.    Nutrition Education:   heart healthy eating principles  maintaining hydration  healthy choices while dining out  group class: Heart Healthy Eating  group class:  Label Reading    Readiness to change: Maintenance: (Maintaining the behavior change)      PSYCHOSOCIAL GOALS AND PLAN    Psychosocial Assessment as it relates to rehabilitation:   Patient denies issues with his/her family or home life that may affect their rehabilitation efforts.     Patient's progress toward Psychosocial goals:    Goals met: great support at home, family oriented,  is great support, no depression noted., Patient will be encouraged to focus on lifestyle modifications following discharge.    Psychosocial Intervention/plan:   Practice relaxation techniques, Exercise, Spend time outdoors, and  gardening     Psychosocial Education: benefits of a positive support system and stress management techniques    Information to utilize [employer] was provided as well as contact information for counseling through  Behavioral [facility] and group psychotherapy groups available.    Readiness to change: Maintenance: (Maintaining the behavior change)      OTHER CORE COMPONENTS GOALS and PLAN      Blood Pressure will be monitored throughout the program and cardiologist will be notified of elevated trends.    Pt will be encouraged to monitor home BP if advised by cardiologist.    Tobacco Plan:   N/A:  Pt is a non-smoker    Progress toward Core Component goals:   Goals met: pt made improvements all around, medications are good, no side effects, monitoring symptoms, exercising at home, still working which helps her., Patient will be encouraged to focus on lifestyle modifications following discharge.    Other Core Components Intervention:   monitor home BP  check labels for sodium content  avoid processed foods  engage in regular exercise for BP control    Group and Individual Education:  proper use of sublingual NTG , class: Understanding Heart Disease, and class:  Common Heart Medications    Readiness to change: Maintenance: (Maintaining the behavior change)

## 2025-07-14 DIAGNOSIS — Z71.84 ENCOUNTER FOR COUNSELING FOR TRAVEL: Primary | ICD-10-CM

## 2025-07-14 RX ORDER — DOXYCYCLINE 100 MG/1
100 CAPSULE ORAL DAILY
Qty: 21 CAPSULE | Refills: 0 | Status: SHIPPED | OUTPATIENT
Start: 2025-07-14 | End: 2025-08-04

## (undated) DEVICE — GUIDEWIRE WHOLEY .035 145 CM FLOP STR TIP

## (undated) DEVICE — RADIFOCUS OPTITORQUE ANGIOGRAPHIC CATHETER: Brand: OPTITORQUE

## (undated) DEVICE — TR BAND RADIAL ARTERY COMPRESSION DEVICE: Brand: TR BAND

## (undated) DEVICE — BALLOON EUPHORA RX 2 X 15MM

## (undated) DEVICE — GUIDEWIRE .035 260CM 3MM J TIP

## (undated) DEVICE — GLIDESHEATH SLENDER STAINLESS STEEL KIT: Brand: GLIDESHEATH SLENDER

## (undated) DEVICE — RUNTHROUGH NS EXTRA FLOPPY PTCA GUIDEWIRE: Brand: RUNTHROUGH

## (undated) DEVICE — CATH GUIDE LAUNCHER 6FR EBU 3.5

## (undated) DEVICE — CATH KIT IMAGING DRAGONFLY OPSTAR

## (undated) DEVICE — HI-TORQUE PILOT 50 GUIDE WIRE .014 STRAIGHT TIP 3.0 CM X 190 CM: Brand: HI-TORQUE PILOT